# Patient Record
Sex: FEMALE | Race: WHITE | NOT HISPANIC OR LATINO | Employment: OTHER | URBAN - METROPOLITAN AREA
[De-identification: names, ages, dates, MRNs, and addresses within clinical notes are randomized per-mention and may not be internally consistent; named-entity substitution may affect disease eponyms.]

---

## 2017-01-03 ENCOUNTER — OFFICE VISIT (OUTPATIENT)
Dept: HEMATOLOGY/ONCOLOGY | Facility: CLINIC | Age: 67
End: 2017-01-03
Payer: MEDICARE

## 2017-01-03 VITALS
WEIGHT: 119.25 LBS | TEMPERATURE: 98 F | DIASTOLIC BLOOD PRESSURE: 68 MMHG | RESPIRATION RATE: 18 BRPM | HEART RATE: 92 BPM | BODY MASS INDEX: 21.94 KG/M2 | SYSTOLIC BLOOD PRESSURE: 121 MMHG | HEIGHT: 62 IN | OXYGEN SATURATION: 99 %

## 2017-01-03 DIAGNOSIS — C50.012 MALIGNANT NEOPLASM OF NIPPLE OF LEFT BREAST IN FEMALE: Primary | ICD-10-CM

## 2017-01-03 DIAGNOSIS — Z17.0 ESTROGEN RECEPTOR POSITIVE: ICD-10-CM

## 2017-01-03 PROCEDURE — 99999 PR PBB SHADOW E&M-EST. PATIENT-LVL III: CPT | Mod: PBBFAC,,, | Performed by: INTERNAL MEDICINE

## 2017-01-03 PROCEDURE — 99213 OFFICE O/P EST LOW 20 MIN: CPT | Mod: S$PBB,,, | Performed by: INTERNAL MEDICINE

## 2017-01-03 PROCEDURE — 99213 OFFICE O/P EST LOW 20 MIN: CPT | Mod: PBBFAC,PO | Performed by: INTERNAL MEDICINE

## 2017-01-03 NOTE — MR AVS SNAPSHOT
[facility]  [address] [place] [address] Drive [address]  [facility] [address]  Phone: [phone]                  Arlene López   1/3/2017 10:20 AM   Office Visit    Description:  Female : 1950   Provider:  Lisette Aguila MD   Department:  [facility]           Reason for Visit     Results           Diagnoses this Visit        Comments    Malignant neoplasm of nipple of left breast in female    -  Primary     Estrogen receptor positive                To Do List           Future Appointments        Provider Department Dept Phone    3/7/2017 1:00 PM Lisette Aguila MD [facility] [phone]      Goals (5 Years of Data)     None      Follow-Up and Disposition     Return in about 8 weeks (around 2017).      [facility]     [facility] Care [facility] -  Assistance  Registered nurses in the [facility] Call Center provide clinical advisement, health education, appointment booking, and other advisory services.  Call for this free service at [phone].             Medications           Message regarding Medications     Verify the changes and/or additions to your medication regime listed below are the same as discussed with your clinician today.  If any of these changes or additions are incorrect, please notify your healthcare provider.             Verify that the below list of medications is an accurate representation of the medications you are currently taking.  If none reported, the list may be blank. If incorrect, please contact your healthcare provider. Carry this list with you in case of emergency.           Current Medications     aspirin (ECOTRIN) 81 MG EC tablet Take 1 tablet (81 mg total) by mouth once daily.    hydrocodone-acetaminophen 5-325mg (NORCO) 5-325 mg per tablet Take 1 tablet by mouth every 4 (four) hours as needed for Pain.    lisinopril 10 MG tablet Take 1 tablet (10 mg total) by mouth once daily.    pravastatin  "(PRAVACHOL) 20 MG tablet Take 1 tablet (20 mg total) by mouth once daily.    epinephrine (EPIPEN) 0.3 mg/0.3 mL AtIn Inject 0.3 mLs (0.3 mg total) into the muscle once.           Clinical Reference Information           Vital Signs - Last Recorded  Most recent update: 1/3/2017 10:32 AM by Chuyita Whipple LPN    BP Pulse Temp Resp Ht Wt    121/68 (BP Location: Right arm, Patient Position: Sitting, BP Method: Automatic) 92 98.1 °F (36.7 °C) (Oral) 18 5' 1.5" (1.562 m) 54.1 kg (119 lb 4.3 oz)    SpO2 BMI             99% 22.17 kg/m2         Blood Pressure          Most Recent Value    BP  121/68      Allergies as of 1/3/2017     Wasp Venom      Immunizations Administered on Date of Encounter - 1/3/2017     None      "

## 2017-01-03 NOTE — PROGRESS NOTES
Subjective:       Patient ID: Arlene López is a 66 y.o. female.    Chief Complaint: Results    HPI   Pt underwent a left breast lumpectomy for stage I T1b N0 M0 invasive lobular carcinoma.  Receptors are strongly positive for estrogen and negative for HER-2/hardeep.  She developed a postop seroma and has undergone drainage of such.  She is not having pain at her surgical site.  Her incision is closed and she has full range of motion of her left upper extremity with no swelling.      FINAL PATHOLOGIC DIAGNOSIS  1. Lymph node, left sentinel, excision:  One lymph node negative for metastatic carcinoma (0/1).  2. Breast, left, lumpectomy:  Invasive lobular carcinoma, 9 mm, grade 2 (pathologic staging: pT1b N0). SEE SYNOPTIC REPORT.  Negative margins of excision.  Two lymph nodes negative for metastatic carcinoma (0/2).  SYNOPTIC REPORT:  Procedure: Excision with image guided localization.  Lymph node sampling: [place] lymph node.  Specimen laterality: Left.  Specimen site: Upper outer quadrant (per requisition).  Tumor size: 9 mm.  Histologic type: Invasive lobular carcinoma.  Histologic grade, glandular/tubular differentiation: Score 3.  Histologic grade, nuclear pleomorphism: Score 2.  Histologic grade, mitotic rate: Score 1.  Overall grade: 2 (scores of 6 or 7).  Tumor focality: Single focus of invasive carcinoma.  Margins:  Deep/posterior - 1.2 mm.    Number of sentinel nodes examined: One (1).  Pathologic staging, primary tumor: pT1b (tumor > 5 mm but </= 10 mm in greatest dimension).  Pathologic staging, regional lymph node(s): pN0.  Additional pathologic findings: Usual ductal hyperplasia and fibrocystic and fibroadenomatous change.  Ancillary studies:  Immunohistochemical stains for hormonal markers are completed on previous breast biopsy ([id number]): Estrogen receptor: Positive; strong nuclear staining in over 95% tumor cells. Progesterone receptor:  Positive; moderate to strong nuclear staining in 3% tumor  "cells. HER-2/hardeep: Negative (Stain score =  0). Microcalcifications: Present in invasive carcinoma and nonneoplastic tissue.  Note: All lymph node tissue was examined at three levels with routine (H&E) stains and with three epithelial    There has been no change in her past medical history since her last office visit    Current Outpatient Prescriptions:     aspirin (ECOTRIN) 81 MG EC tablet, Take 1 tablet (81 mg total) by mouth once daily., Disp: 90 tablet, Rfl: 3    hydrocodone-acetaminophen 5-325mg (NORCO) 5-325 mg per tablet, Take 1 tablet by mouth every 4 (four) hours as needed for Pain., Disp: 30 tablet, Rfl: 0    lisinopril 10 MG tablet, Take 1 tablet (10 mg total) by mouth once daily., Disp: 90 tablet, Rfl: 3    pravastatin (PRAVACHOL) 20 MG tablet, Take 1 tablet (20 mg total) by mouth once daily., Disp: 90 tablet, Rfl: 3    epinephrine (EPIPEN) 0.3 mg/0.3 mL AtIn, Inject 0.3 mLs (0.3 mg total) into the muscle once., Disp: 0.3 mL, Rfl: 0    All medications and past history have been reviewed.  Review of Systems    CONSTITUTIONAL: No fevers, chills, night sweats, wt. loss, appetite changes  SKIN: no rashes or itching  ENT: No headaches, head trauma, vision changes, or eye pain  LYMPH NODES: None noticed   CV: No chest pain, palpitations.   RESP: No dyspnea on exertion, cough, wheezing, or hemoptysis  GI: No nausea, emesis, diarrhea, constipation, melena, hematochezia, pain.   : No dysuria, hematuria, urgency, or frequency   HEME: No easy bruising, bleeding problems  PSYCHIATRIC: No depression, anxiety, psychosis, hallucinations.  NEURO: No seizures, memory loss, dizziness or difficulty sleeping  MSK: No arthralgias or joint swelling  Objective:       Visit Vitals    /68 (BP Location: Right arm, Patient Position: Sitting, BP Method: Automatic)    Pulse 92    Temp 98.1 °F (36.7 °C) (Oral)    Resp 18    Ht 5' 1.5" (1.562 m)    Wt 54.1 kg (119 lb 4.3 oz)    SpO2 99%    BMI 22.17 kg/m2 "       Physical Exam    Gen: NAD, A and O x3,   Psych: pleasant affect, normal thought process  Eyes: Pupils round and non dilated, EOM intact  Nose: Nares patent  OP clear, mucosa patent  Chest:no use of accessory muscles  Abd: no distention  Extr: No CCE, OSMAN  Neuro: steady gait, CNs grossly intact  Skin: intact, no lesions noted  Rheum: No joint swelling  All lab results and imaging results have been reviewed and discussed with the patient    Pathology and staging reviewed    Assessment:       1. Malignant neoplasm of nipple of left breast in female    2. Estrogen receptor positive        Plan:         Pt ready for post lumpectomy radiotherapy   We discussed whether [facility] will be closer to her in [facility] for radiation  Patient agrees to come to [place] for her radiation hence we'll make her an appointment to see the radiation oncologist at [facility]  She will then be offered  adjuvant endocrine therapy for 5 years with arimidex  Side effects of been explained including possible fluid retention, hot flashes, night sweats, decrease in bone mineral density and dyslipidemia as well as transaminitis  I will see her back in about 8 weeks when she has completed postlumpectomy radiotherapy and at that time Arimidex will be prescribed  She'll be due for a DEXA scan sometime in the near future to evaluate her bone mineral density prior to starting Arimidex  The plan was discussed with the patient and all questions/concerns have been answered to the patient's satisfaction.          Thank you for allowing me to participate in this pleasant patient's care. Please call with any questions or concerns.

## 2017-03-10 ENCOUNTER — OFFICE VISIT (OUTPATIENT)
Dept: HEMATOLOGY/ONCOLOGY | Facility: CLINIC | Age: 67
End: 2017-03-10
Payer: MEDICARE

## 2017-03-10 VITALS
TEMPERATURE: 98 F | HEART RATE: 89 BPM | BODY MASS INDEX: 22.27 KG/M2 | DIASTOLIC BLOOD PRESSURE: 74 MMHG | HEIGHT: 61 IN | SYSTOLIC BLOOD PRESSURE: 163 MMHG | WEIGHT: 117.94 LBS

## 2017-03-10 DIAGNOSIS — F17.200 CURRENT SMOKER: ICD-10-CM

## 2017-03-10 DIAGNOSIS — C50.012 MALIGNANT NEOPLASM OF NIPPLE OF LEFT BREAST IN FEMALE: Primary | ICD-10-CM

## 2017-03-10 DIAGNOSIS — Z17.0 ESTROGEN RECEPTOR POSITIVE: ICD-10-CM

## 2017-03-10 DIAGNOSIS — R05.9 COUGH: ICD-10-CM

## 2017-03-10 PROCEDURE — 99999 PR PBB SHADOW E&M-EST. PATIENT-LVL II: CPT | Mod: PBBFAC,,, | Performed by: INTERNAL MEDICINE

## 2017-03-10 PROCEDURE — 99212 OFFICE O/P EST SF 10 MIN: CPT | Mod: PBBFAC,PO | Performed by: INTERNAL MEDICINE

## 2017-03-10 PROCEDURE — 99215 OFFICE O/P EST HI 40 MIN: CPT | Mod: S$PBB,,, | Performed by: INTERNAL MEDICINE

## 2017-03-10 RX ORDER — NAPROXEN SODIUM 220 MG/1
TABLET, FILM COATED ORAL
COMMUNITY
Start: 2017-02-24 | End: 2017-03-10

## 2017-03-10 NOTE — PROGRESS NOTES
Subjective:       Patient ID: Arlene López is a 66 y.o. female.    Chief Complaint: Follow-up (8 week fu - no labs)    HPI   Pt underwent a left breast lumpectomy for stage I T1b N0 M0 invasive lobular carcinoma.  Receptors are strongly positive for estrogen and negative for HER-2/hardeep.  She completed postlumpectomy radiation  She is here to discuss adjuvant hormonal therapy    She cannot remember when the last mammogram was performed on her right breast  FINAL PATHOLOGIC DIAGNOSIS  1. Lymph node, left sentinel, excision:  One lymph node negative for metastatic carcinoma (0/1).  2. Breast, left, lumpectomy:  Invasive lobular carcinoma, 9 mm, grade 2 (pathologic staging: pT1b N0). SEE SYNOPTIC REPORT.  Negative margins of excision.  Two lymph nodes negative for metastatic carcinoma (0/2).  SYNOPTIC REPORT:  Procedure: Excision with image guided localization.  Lymph node sampling: [place] lymph node.  Specimen laterality: Left.  Specimen site: Upper outer quadrant (per requisition).  Tumor size: 9 mm.  Histologic type: Invasive lobular carcinoma.  Histologic grade, glandular/tubular differentiation: Score 3.  Histologic grade, nuclear pleomorphism: Score 2.  Histologic grade, mitotic rate: Score 1.  Overall grade: 2 (scores of 6 or 7).  Tumor focality: Single focus of invasive carcinoma.  Margins:  Deep/posterior - 1.2 mm.    Number of sentinel nodes examined: One (1).  Pathologic staging, primary tumor: pT1b (tumor > 5 mm but </= 10 mm in greatest dimension).  Pathologic staging, regional lymph node(s): pN0.  Additional pathologic findings: Usual ductal hyperplasia and fibrocystic and fibroadenomatous change.  Ancillary studies:  Immunohistochemical stains for hormonal markers are completed on previous breast biopsy ([id number]): Estrogen receptor: Positive; strong nuclear staining in over 95% tumor cells. Progesterone receptor:  Positive; moderate to strong nuclear staining in 3% tumor cells. HER-2/hardeep: Negative  "(Stain score =  0). Microcalcifications: Present in invasive carcinoma and nonneoplastic tissue.  Note: All lymph node tissue was examined at three levels with routine (H&E) stains and with three epithelial    There has been no change in her past medical history since her last office visit    Current Outpatient Prescriptions:     epinephrine (EPIPEN) 0.3 mg/0.3 mL AtIn, Inject 0.3 mLs (0.3 mg total) into the muscle once., Disp: 0.3 mL, Rfl: 0    aspirin (ECOTRIN) 81 MG EC tablet, Take 1 tablet (81 mg total) by mouth once daily., Disp: 90 tablet, Rfl: 3    lisinopril 10 MG tablet, Take 1 tablet (10 mg total) by mouth once daily., Disp: 90 tablet, Rfl: 3    pravastatin (PRAVACHOL) 20 MG tablet, Take 1 tablet (20 mg total) by mouth once daily., Disp: 90 tablet, Rfl: 3    All medications and past history have been reviewed.  Review of Systems    CONSTITUTIONAL: No fevers, chills, night sweats, wt. loss, appetite changes  SKIN: no rashes or itching  ENT: No headaches, head trauma, vision changes, or eye pain  LYMPH NODES: None noticed   CV: No chest pain, palpitations.   RESP: No dyspnea on exertion, cough, wheezing, or hemoptysis  GI: No nausea, emesis, diarrhea, constipation, melena, hematochezia, pain.   : No dysuria, hematuria, urgency, or frequency   HEME: No easy bruising, bleeding problems  PSYCHIATRIC: No depression, anxiety, psychosis, hallucinations.  NEURO: No seizures, memory loss, dizziness or difficulty sleeping  MSK: No arthralgias or joint swelling  Objective:       BP (!) 163/74  Pulse 89  Temp 98.2 °F (36.8 °C)  Ht 5' 1" (1.549 m)  Wt 53.5 kg (117 lb 15.1 oz)  BMI 22.29 kg/m2    Physical Exam    Gen: NAD, A and O x3,   Psych: pleasant affect, normal thought process  Eyes: Pupils round and non dilated, EOM intact  Nose: Nares patent  OP clear, mucosa patent  Chest:no use of accessory muscles  Abd: no distention  Extr: No CCE, OSMAN  Neuro: steady gait, CNs grossly intact  Skin: intact, no " lesions noted    All lab results and imaging results have been reviewed and discussed with the patient    Pathology and staging reviewed    Assessment:       1. Malignant neoplasm of nipple of left breast in female    2. Current smoker    3. Estrogen receptor positive    4. Cough        Plan:         6 months from now she will undergo a mammogram for her left breast   Currently due for mammogram on her right breast  Discussed tobacco sensation  Now that she completed post lumpectomy radiation She will be offered  adjuvant endocrine therapy for 5 years with arimidex  Side effects of been explained including possible fluid retention, hot flashes, night sweats, decrease in bone mineral density and dyslipidemia as well as transaminitis  Since she is allergic to wasps I willl order her an epipen  She'll be due for a DEXA scan sometime in the near future to evaluate her bone mineral density prior to starting Arimidex  The plan was discussed with the patient and all questions/concerns have been answered to the patient's satisfaction.          Thank you for allowing me to participate in this pleasant patient's care. Please call with any questions or concerns.

## 2017-03-31 PROBLEM — M79.662 PAIN OF LEFT LOWER LEG: Status: ACTIVE | Noted: 2017-03-31

## 2017-03-31 PROBLEM — M79.605 LEFT LEG PAIN: Status: ACTIVE | Noted: 2017-03-31

## 2017-03-31 PROBLEM — R45.86 EMOTIONAL LABILITY: Status: ACTIVE | Noted: 2017-03-31

## 2017-04-07 PROBLEM — M79.672 LEFT FOOT PAIN: Status: ACTIVE | Noted: 2017-04-07

## 2017-04-10 ENCOUNTER — TELEPHONE (OUTPATIENT)
Dept: HEMATOLOGY/ONCOLOGY | Facility: CLINIC | Age: 67
End: 2017-04-10

## 2017-04-10 NOTE — TELEPHONE ENCOUNTER
----- Message from Sarah Romero sent at 4/10/2017  1:25 PM CDT -----  [facility]  needs callback hernán richards (pt waiting)...[phone]

## 2017-04-10 NOTE — TELEPHONE ENCOUNTER
Returned Dr. Molina's phone call and he explained that the pt is there to have her uni lateral mammo but he thinks the pt needs a alex mammo and wanted me to check with Dr. morgan if it was ok to do so and send a new order if she agrees.  Explained to Dr. Molina that Dr. Mrogan is not in the office this afternoon as it is her post call Monday, but I would be happy to ask Dr. Cooley.  Dr. Cooley said that was fine to order a alex mammo with alex breast u/s if needed and faxed a new order to Dr. Molina at the fax number provided.  Dr. Molina verbalizes understanding and appreciation.

## 2017-04-11 ENCOUNTER — OFFICE VISIT (OUTPATIENT)
Dept: HEMATOLOGY/ONCOLOGY | Facility: CLINIC | Age: 67
End: 2017-04-11
Payer: MEDICARE

## 2017-04-11 VITALS
SYSTOLIC BLOOD PRESSURE: 142 MMHG | BODY MASS INDEX: 22.09 KG/M2 | WEIGHT: 117 LBS | RESPIRATION RATE: 18 BRPM | DIASTOLIC BLOOD PRESSURE: 74 MMHG | HEIGHT: 61 IN | HEART RATE: 94 BPM

## 2017-04-11 DIAGNOSIS — Z51.81 VISIT FOR MONITORING ARIMIDEX THERAPY: ICD-10-CM

## 2017-04-11 DIAGNOSIS — R71.8 ERYTHROCYTE ABNORMALITY: ICD-10-CM

## 2017-04-11 DIAGNOSIS — C50.412 BREAST CANCER OF UPPER-OUTER QUADRANT OF LEFT FEMALE BREAST: Primary | ICD-10-CM

## 2017-04-11 DIAGNOSIS — Z17.0 ESTROGEN RECEPTOR POSITIVE: ICD-10-CM

## 2017-04-11 DIAGNOSIS — Z79.811 VISIT FOR MONITORING ARIMIDEX THERAPY: ICD-10-CM

## 2017-04-11 PROCEDURE — 99215 OFFICE O/P EST HI 40 MIN: CPT | Mod: S$GLB,,, | Performed by: INTERNAL MEDICINE

## 2017-04-11 RX ORDER — ANASTROZOLE 1 MG/1
1 TABLET ORAL DAILY
COMMUNITY
Start: 2017-03-13 | End: 2017-07-25 | Stop reason: SDUPTHER

## 2017-04-11 NOTE — MR AVS SNAPSHOT
[facility]  [address] [place] [address]  Phone: [phone]                  Arlene López   2017 1:40 PM   Office Visit    Description:  Female : 1950   Provider:  Lisette Aguila MD   Department:  [facility]           Reason for Visit     Follow-up                To Do List           Goals (5 Years of Data)     None      [facility]     [facility] On Call Nurse Care Line -  Assistance  Unless otherwise directed by your provider, please contact [facility], our nurse care line that is available for  assistance.     Registered nurses in the [facility] Center provide: appointment scheduling, clinical advisement, health education, and other advisory services.  Call: [phone] (toll free)               Medications           Message regarding Medications     Verify the changes and/or additions to your medication regime listed below are the same as discussed with your clinician today.  If any of these changes or additions are incorrect, please notify your healthcare provider.             Verify that the below list of medications is an accurate representation of the medications you are currently taking.  If none reported, the list may be blank. If incorrect, please contact your healthcare provider. Carry this list with you in case of emergency.           Current Medications     anastrozole (ARIMIDEX) 1 mg Tab Take 1 mg by mouth once daily.    aspirin (ECOTRIN) 81 MG EC tablet Take 1 tablet (81 mg total) by mouth once daily.    epinephrine (EPIPEN) 0.3 mg/0.3 mL AtIn Inject 0.3 mLs (0.3 mg total) into the muscle once.    escitalopram oxalate (LEXAPRO) 10 MG tablet Take 1 tablet (10 mg total) by mouth once daily.    lisinopril 10 MG tablet Take 1 tablet (10 mg total) by mouth once daily.    pravastatin (PRAVACHOL) 20 MG tablet Take 1 tablet (20 mg total) by mouth once daily.           Clinical Reference  "Information           Your Vitals Were     BP Pulse Resp Height Weight BMI    142/74 94 18 5' 1" (1.549 m) 53.1 kg (117 lb) 22.11 kg/m2      Blood Pressure          Most Recent Value    BP  (!)  142/74      Allergies as of 4/11/2017     Venom-wasp      Immunizations Administered on Date of Encounter - 4/11/2017     None      Language Assistance Services     ATTENTION: Language assistance services are available, free of charge. Please call [phone].      ATENCIÓN: Si habla ellis, tiene a preston disposición servicios gratuitos de asistencia lingüística. Llame al [phone].     MIKY Ý: N?u b?n nói Ti?ng Vi?t, có các d?ch v? h? tr? ngôn ng? mi?n phí dành cho b?n. G?i s? [phone].         [facility] complies with applicable Federal civil rights laws and does not discriminate on the basis of race, color, national origin, age, disability, or sex.        "

## 2017-04-11 NOTE — PROGRESS NOTES
Subjective:       Patient ID: Arlene López is a 66 y.o. female.    Chief Complaint: Follow-up    HPI   Pt underwent a left breast lumpectomy for stage I T1b N0 M0 invasive lobular carcinoma.  Receptors are strongly positive for estrogen and negative for HER-2/hardeep.  She completed postlumpectomy radiation  She started arimidex in March of 2017 as adjuvant endocrine therapy    There has been no change in her past medical history since her last office visit  She is tolerating this medication without complications  She underwent a mammogram yesterday    Current Outpatient Prescriptions:     anastrozole (ARIMIDEX) 1 mg Tab, Take 1 mg by mouth once daily., Disp: , Rfl:     aspirin (ECOTRIN) 81 MG EC tablet, Take 1 tablet (81 mg total) by mouth once daily., Disp: 90 tablet, Rfl: 3    epinephrine (EPIPEN) 0.3 mg/0.3 mL AtIn, Inject 0.3 mLs (0.3 mg total) into the muscle once., Disp: 0.3 mL, Rfl: 0    escitalopram oxalate (LEXAPRO) 10 MG tablet, Take 1 tablet (10 mg total) by mouth once daily., Disp: 30 tablet, Rfl: 11    lisinopril 10 MG tablet, Take 1 tablet (10 mg total) by mouth once daily., Disp: 90 tablet, Rfl: 3    pravastatin (PRAVACHOL) 20 MG tablet, Take 1 tablet (20 mg total) by mouth once daily., Disp: 90 tablet, Rfl: 3    All medications and past history have been reviewed.  Review of Systems    CONSTITUTIONAL: No fevers, chills, night sweats, wt. loss, appetite changes  SKIN: no rashes or itching  ENT: No headaches, head trauma, vision changes, or eye pain  LYMPH NODES: None noticed   CV: No chest pain, palpitations.   RESP: No dyspnea on exertion, cough, wheezing, or hemoptysis  GI: No nausea, emesis, diarrhea, constipation, melena, hematochezia, pain.   : No dysuria, hematuria, urgency, or frequency   HEME: No easy bruising, bleeding problems  PSYCHIATRIC: No depression, anxiety, psychosis, hallucinations.  NEURO: No seizures, memory loss, dizziness or difficulty sleeping  MSK: No arthralgias or joint  "swelling  Objective:       BP (!) 142/74  Pulse 94  Resp 18  Ht 5' 1" (1.549 m)  Wt 53.1 kg (117 lb)  BMI 22.11 kg/m2    Physical Exam    Gen: NAD, A and O x3,   Psych: pleasant affect, normal thought process  Eyes: Pupils round and non dilated, EOM intact  Nose: Nares patent  OP clear, mucosa patent  Chest:no use of accessory muscles  Abd: no distention  Extr: No CCE, OSMAN  Neuro: steady gait, CNs grossly intact  Skin: intact, no lesions noted    All lab results and imaging results have been reviewed and discussed with the patient  Lab Results   Component Value Date    WBC 7.2 03/24/2017    HGB 15.9 (H) 03/24/2017    HCT 48.0 (H) 03/24/2017    MCV 95.2 03/24/2017     03/24/2017     CMP  Sodium   Date Value Ref Range Status   03/24/2017 137 135 - 146 mmol/L Final     Potassium   Date Value Ref Range Status   03/24/2017 4.1 3.5 - 5.3 mmol/L Final     Chloride   Date Value Ref Range Status   03/24/2017 103 98 - 110 mmol/L Final     CO2   Date Value Ref Range Status   03/24/2017 25 20 - 31 mmol/L Final     Glucose   Date Value Ref Range Status   03/24/2017 135 (H) 65 - 99 mg/dL Final     Comment:                   Fasting reference interval          BUN, Bld   Date Value Ref Range Status   03/24/2017 7 7 - 25 mg/dL Final     Creatinine   Date Value Ref Range Status   03/24/2017 0.63 0.50 - 0.99 mg/dL Final     Comment:     For patients >49 years of age, the reference limit  for Creatinine is approximately 13% higher for people  identified as -American.          Calcium   Date Value Ref Range Status   03/24/2017 9.0 8.6 - 10.4 mg/dL Final     Total Protein   Date Value Ref Range Status   03/24/2017 6.4 6.1 - 8.1 g/dL Final     Albumin   Date Value Ref Range Status   03/24/2017 3.6 3.6 - 5.1 g/dL Final     Total Bilirubin   Date Value Ref Range Status   03/24/2017 0.5 0.2 - 1.2 mg/dL Final     Alkaline Phosphatase   Date Value Ref Range Status   03/24/2017 81 33 - 130 U/L Final     AST   Date Value Ref " Range Status   03/24/2017 19 10 - 35 U/L Final     ALT   Date Value Ref Range Status   03/24/2017 12 6 - 29 U/L Final     Anion Gap   Date Value Ref Range Status   12/02/2016 14 8 - 16 mmol/L Final     eGFR if    Date Value Ref Range Status   03/24/2017 107 > OR = 60 mL/min/1.73m2 Final     eGFR if non    Date Value Ref Range Status   03/24/2017 92 > OR = 60 mL/min/1.73m2 Final       Pathology and staging reviewed    Called radiology to get mammogram results of bilateral mammogram  Assessment:       1. Breast cancer of upper-outer quadrant of left female breast    2. Estrogen receptor positive    3. Visit for monitoring Arimidex therapy    4. Erythrocyte abnormality        Plan:         Elevated Hb likely due to tobacco use  Counseled pt on cessation  Pt is to continue arimidex to decrease risk of recurrence  Check LFTs and lipid panel next visit since AI can cause abn of such  SHe will be due for dexa next visit in 4 months   Calcium and vitamin D essential        Thank you for allowing me to participate in this pleasant patient's care. Please call with any questions or concerns.

## 2017-05-02 DIAGNOSIS — C50.412 MALIGNANT NEOPLASM OF UPPER-OUTER QUADRANT OF LEFT FEMALE BREAST: Primary | ICD-10-CM

## 2017-05-02 DIAGNOSIS — M81.0 OSTEOPOROSIS, UNSPECIFIED OSTEOPOROSIS TYPE, UNSPECIFIED PATHOLOGICAL FRACTURE PRESENCE: ICD-10-CM

## 2017-07-06 ENCOUNTER — TELEPHONE (OUTPATIENT)
Dept: HEMATOLOGY/ONCOLOGY | Facility: CLINIC | Age: 67
End: 2017-07-06

## 2017-07-06 NOTE — TELEPHONE ENCOUNTER
----- Message from Zeynep Bustamante sent at 7/6/2017  9:16 AM CDT -----  Patient spouse Dalton is requesting a return call, has questions regarding medication cotnact number [phone].    Thank you

## 2017-07-07 NOTE — TELEPHONE ENCOUNTER
----- Message from Rosa Bills sent at 7/7/2017 10:12 AM CDT -----  Contact: self [phone]  She would like to know what medication she is on.  Please call her.  Thank you!

## 2017-07-10 NOTE — TELEPHONE ENCOUNTER
----- Message from Ariadna Carvalho sent at 7/10/2017 10:40 AM CDT -----  Contact: self  Patient called regarding a refill of medication. Would like her medication to be sent today. Please contact [phone] (home)     anastrozole (ARIMIDEX) 1 mg Tab    [facility] [place] [place] [facility]  [address] [facility] [address] [place] [address]  Phone: [phone] Fax: [phone]

## 2017-07-13 RX ORDER — ANASTROZOLE 1 MG/1
1 TABLET ORAL DAILY
Qty: 30 TABLET | Refills: 0 | OUTPATIENT
Start: 2017-07-13

## 2017-07-14 ENCOUNTER — TELEPHONE (OUTPATIENT)
Dept: HEMATOLOGY/ONCOLOGY | Facility: CLINIC | Age: 67
End: 2017-07-14

## 2017-07-14 NOTE — TELEPHONE ENCOUNTER
----- Message from Renata Choudhary sent at 7/14/2017 11:26 AM CDT -----  Contact: pt  Pt would like to know if her prescription is ready to be picked up from the office today..[phone] (home)

## 2017-07-14 NOTE — TELEPHONE ENCOUNTER
----- Message from Sarah Tavarez sent at 7/14/2017  9:41 AM CDT -----  Contact: self   Patient need a refill on ARIMIDEX 1 mg will  at office please call when ready at [phone]

## 2017-07-19 ENCOUNTER — TELEPHONE (OUTPATIENT)
Dept: HEMATOLOGY/ONCOLOGY | Facility: CLINIC | Age: 67
End: 2017-07-19

## 2017-07-19 NOTE — TELEPHONE ENCOUNTER
Call number stated in message no answer. Left message on home number instructing patient to call [phone] with concerns.

## 2017-07-19 NOTE — TELEPHONE ENCOUNTER
----- Message from Monica Duffy sent at 7/19/2017  9:26 AM CDT -----  Contact: self  Patient called stating she spoke to a nurse regarding a refill on her medication   She was suppose to call her back   She has not had a response     Please call her at  to advise.     Thanks

## 2017-07-20 ENCOUNTER — TELEPHONE (OUTPATIENT)
Dept: HEMATOLOGY/ONCOLOGY | Facility: CLINIC | Age: 67
End: 2017-07-20

## 2017-07-20 NOTE — TELEPHONE ENCOUNTER
----- Message from eRnata Choudhary sent at 7/20/2017 12:33 PM CDT -----  Contact: pt  Pt returning your phone call...[phone]

## 2017-07-21 ENCOUNTER — TELEPHONE (OUTPATIENT)
Dept: HEMATOLOGY/ONCOLOGY | Facility: CLINIC | Age: 67
End: 2017-07-21

## 2017-07-21 NOTE — TELEPHONE ENCOUNTER
----- Message from Ariadna Carvalho sent at 7/21/2017 10:08 AM CDT -----  Contact: self  Patient called regarding issue with medication. Left a message prior, no contact. Please contact [phone] (home) [phone]

## 2017-07-21 NOTE — TELEPHONE ENCOUNTER
----- Message from Sarah Tavarez sent at 7/20/2017  1:41 PM CDT -----  Contact: self   Placed call to pod, patient miss call from your office please call back at [phone]

## 2017-07-25 DIAGNOSIS — C50.919 MALIGNANT NEOPLASM OF FEMALE BREAST, UNSPECIFIED ESTROGEN RECEPTOR STATUS, UNSPECIFIED LATERALITY, UNSPECIFIED SITE OF BREAST: Primary | ICD-10-CM

## 2017-07-25 RX ORDER — ANASTROZOLE 1 MG/1
1 TABLET ORAL DAILY
Qty: 90 TABLET | Refills: 0 | Status: SHIPPED | OUTPATIENT
Start: 2017-07-25 | End: 2017-10-17 | Stop reason: SDUPTHER

## 2017-07-25 NOTE — TELEPHONE ENCOUNTER
----- Message from Lucy Li sent at 7/25/2017 11:09 AM CDT -----  Contact: self  Call placed to pod.  Patient requested a refill 3 weeks ago for anastrozole (ARIMIDEX) 1 mg Tab and it hasn't been refilled yet. Please call back at [phone] (home)       [place] - [address] [facility]  [address] [facility] [address] [facility] [place] [address]  Phone: [phone] Fax: [phone]

## 2017-09-14 ENCOUNTER — TELEPHONE (OUTPATIENT)
Dept: HEMATOLOGY/ONCOLOGY | Facility: CLINIC | Age: 67
End: 2017-09-14

## 2017-09-14 NOTE — TELEPHONE ENCOUNTER
Returned Heydi's phone call and she said she already found the correct code and does not need anything else at the time.

## 2017-09-14 NOTE — TELEPHONE ENCOUNTER
----- Message from Erica Woodward sent at 9/14/2017 11:59 AM CDT -----  Contact: Heydi [facility]  Heydi with [facility] want to speak with a nurse regarding a wrong diagnostic code for mammogram. Please call back [phone] fax [phone]

## 2017-09-20 ENCOUNTER — TELEPHONE (OUTPATIENT)
Dept: HEMATOLOGY/ONCOLOGY | Facility: CLINIC | Age: 67
End: 2017-09-20

## 2017-10-17 ENCOUNTER — OFFICE VISIT (OUTPATIENT)
Dept: HEMATOLOGY/ONCOLOGY | Facility: CLINIC | Age: 67
End: 2017-10-17
Payer: MEDICARE

## 2017-10-17 VITALS
DIASTOLIC BLOOD PRESSURE: 74 MMHG | HEIGHT: 61 IN | HEART RATE: 98 BPM | BODY MASS INDEX: 21.96 KG/M2 | SYSTOLIC BLOOD PRESSURE: 124 MMHG | WEIGHT: 116.31 LBS | RESPIRATION RATE: 18 BRPM

## 2017-10-17 DIAGNOSIS — Z79.811 VISIT FOR MONITORING ARIMIDEX THERAPY: ICD-10-CM

## 2017-10-17 DIAGNOSIS — M85.80 OSTEOPENIA, UNSPECIFIED LOCATION: ICD-10-CM

## 2017-10-17 DIAGNOSIS — Z17.0 ESTROGEN RECEPTOR POSITIVE: ICD-10-CM

## 2017-10-17 DIAGNOSIS — R92.30 DENSE BREASTS: ICD-10-CM

## 2017-10-17 DIAGNOSIS — C50.912 MALIGNANT NEOPLASM OF LEFT FEMALE BREAST, UNSPECIFIED ESTROGEN RECEPTOR STATUS, UNSPECIFIED SITE OF BREAST: Primary | ICD-10-CM

## 2017-10-17 DIAGNOSIS — Z51.81 VISIT FOR MONITORING ARIMIDEX THERAPY: ICD-10-CM

## 2017-10-17 DIAGNOSIS — D75.1 POLYCYTHEMIA, SECONDARY: ICD-10-CM

## 2017-10-17 DIAGNOSIS — C50.919 MALIGNANT NEOPLASM OF FEMALE BREAST, UNSPECIFIED ESTROGEN RECEPTOR STATUS, UNSPECIFIED LATERALITY, UNSPECIFIED SITE OF BREAST: ICD-10-CM

## 2017-10-17 PROCEDURE — 99215 OFFICE O/P EST HI 40 MIN: CPT | Mod: S$GLB,,, | Performed by: INTERNAL MEDICINE

## 2017-10-17 RX ORDER — PREDNISOLONE ACETATE 10 MG/ML
SUSPENSION/ DROPS OPHTHALMIC
COMMUNITY
Start: 2017-09-14 | End: 2020-11-16 | Stop reason: SDUPTHER

## 2017-10-17 RX ORDER — ANASTROZOLE 1 MG/1
1 TABLET ORAL DAILY
Qty: 90 TABLET | Refills: 0 | Status: SHIPPED | OUTPATIENT
Start: 2017-10-17 | End: 2018-01-22 | Stop reason: SDUPTHER

## 2017-10-17 RX ORDER — NAPROXEN SODIUM 220 MG/1
TABLET, FILM COATED ORAL
COMMUNITY
Start: 2017-09-05 | End: 2017-11-29

## 2017-10-17 RX ORDER — OFLOXACIN 3 MG/ML
SOLUTION/ DROPS OPHTHALMIC
COMMUNITY
Start: 2017-09-14 | End: 2019-08-07 | Stop reason: ALTCHOICE

## 2017-10-17 NOTE — PROGRESS NOTES
Subjective:       Patient ID: Arlene López is a 67 y.o. female.    Chief Complaint: Follow-up    HPI   Pt underwent a left breast lumpectomy for stage I T1b N0 M0 invasive lobular carcinoma.  Receptors are strongly positive for estrogen and negative for HER-2/hardeep.  She completed postlumpectomy radiation  She started arimidex in March of 2017 as adjuvant endocrine therapy    Mammogram performed revealed heterogeneously dense tissue, slightly improved appearance of the postop seroma.  Close follow-up recommended in 6 months.  Bone mineral density scan showed osteopenia for her hips and normal mineralization of her spine    Current Outpatient Prescriptions:     anastrozole (ARIMIDEX) 1 mg Tab, Take 1 tablet (1 mg total) by mouth once daily., Disp: 90 tablet, Rfl: 0    aspirin (ECOTRIN) 81 MG EC tablet, Take 1 tablet (81 mg total) by mouth once daily., Disp: 90 tablet, Rfl: 3    aspirin 81 MG Chew, , Disp: , Rfl:     epinephrine (EPIPEN) 0.3 mg/0.3 mL AtIn, Inject 0.3 mLs (0.3 mg total) into the muscle once., Disp: 0.3 mL, Rfl: 0    escitalopram oxalate (LEXAPRO) 10 MG tablet, Take 1 tablet (10 mg total) by mouth once daily., Disp: 30 tablet, Rfl: 11    lisinopril 10 MG tablet, Take 1 tablet (10 mg total) by mouth once daily., Disp: 90 tablet, Rfl: 3    ofloxacin (OCUFLOX) 0.3 % ophthalmic solution, , Disp: , Rfl:     pravastatin (PRAVACHOL) 20 MG tablet, Take 1 tablet (20 mg total) by mouth once daily., Disp: 90 tablet, Rfl: 3    prednisoLONE acetate (PRED FORTE) 1 % DrpS, , Disp: , Rfl:     All medications and past history have been reviewed.  Review of Systems    CONSTITUTIONAL: No fevers, chills, night sweats, wt. loss, appetite changes  SKIN: no rashes or itching  ENT: No headaches, head trauma, vision changes, or eye pain  LYMPH NODES: None noticed   CV: No chest pain, palpitations.   RESP: No dyspnea on exertion, cough, wheezing, or hemoptysis  GI: No nausea, emesis, diarrhea, constipation, melena,  "hematochezia, pain.   : No dysuria, hematuria, urgency, or frequency   HEME: No easy bruising, bleeding problems  PSYCHIATRIC: No depression, anxiety, psychosis, hallucinations.  NEURO: No seizures, memory loss, dizziness or difficulty sleeping  MSK: No arthralgias or joint swelling  Objective:       /74   Pulse 98   Resp 18   Ht 5' 1" (1.549 m)   Wt 52.8 kg (116 lb 4.8 oz)   BMI 21.97 kg/m²     Physical Exam    Gen: NAD, A and O x3,   Psych: pleasant affect, normal thought process  Eyes: Preset corneal surgery and cataract noted  Nose: Nares patent  OP clear, mucosa patent  Chest:no use of accessory muscles  Abd: no distention  Extr: No CCE, OSMAN  Neuro: steady gait, CNs grossly intact  Skin: intact, no lesions noted    All lab results and imaging results have been reviewed and discussed with the patient  Lab Results   Component Value Date    WBC 7.9 07/19/2017    HGB 16.5 (H) 07/19/2017    HCT 47.2 (H) 07/19/2017    MCV 94.6 07/19/2017    PLT TNP 07/19/2017     CMP  Sodium   Date Value Ref Range Status   07/19/2017 137 135 - 146 mmol/L Final     Potassium   Date Value Ref Range Status   07/19/2017 3.9 3.5 - 5.3 mmol/L Final     Chloride   Date Value Ref Range Status   07/19/2017 102 98 - 110 mmol/L Final     CO2   Date Value Ref Range Status   07/19/2017 23 20 - 31 mmol/L Final     Glucose   Date Value Ref Range Status   07/19/2017 101 (H) 65 - 99 mg/dL Final     Comment:                   Fasting reference interval     For someone without known diabetes, a glucose value  between 100 and 125 mg/dL is consistent with  prediabetes and should be confirmed with a  follow-up test.          BUN, Bld   Date Value Ref Range Status   07/19/2017 6 (L) 7 - 25 mg/dL Final     Creatinine   Date Value Ref Range Status   07/19/2017 0.56 0.50 - 0.99 mg/dL Final     Comment:     For patients >49 years of age, the reference limit  for Creatinine is approximately 13% higher for people  identified as -American.   "        Calcium   Date Value Ref Range Status   07/19/2017 9.4 8.6 - 10.4 mg/dL Final     Total Protein   Date Value Ref Range Status   07/19/2017 6.6 6.1 - 8.1 g/dL Final     Albumin   Date Value Ref Range Status   07/19/2017 4.0 3.6 - 5.1 g/dL Final     Total Bilirubin   Date Value Ref Range Status   07/19/2017 0.6 0.2 - 1.2 mg/dL Final     Alkaline Phosphatase   Date Value Ref Range Status   07/19/2017 73 33 - 130 U/L Final     AST   Date Value Ref Range Status   07/19/2017 24 10 - 35 U/L Final     ALT   Date Value Ref Range Status   07/19/2017 15 6 - 29 U/L Final     Anion Gap   Date Value Ref Range Status   12/02/2016 14 8 - 16 mmol/L Final     eGFR if    Date Value Ref Range Status   07/19/2017 110 > OR = 60 mL/min/1.73m2 Final     eGFR if non    Date Value Ref Range Status   07/19/2017 95 > OR = 60 mL/min/1.73m2 Final       Pathology and staging reviewed    Called radiology to get mammogram results of bilateral mammogram  Assessment:       1. Malignant neoplasm of left female breast, unspecified estrogen receptor status, unspecified site of breast    2. Malignant neoplasm of female breast, unspecified estrogen receptor status, unspecified laterality, unspecified site of breast    3. Dense breasts    4. Osteopenia, unspecified location    5. Estrogen receptor positive    6. Visit for monitoring Arimidex therapy        Plan:       Malignant neoplasm of left female breast, unspecified estrogen receptor status, unspecified site of breast    Malignant neoplasm of female breast, unspecified estrogen receptor status, unspecified laterality, unspecified site of breast  -     anastrozole (ARIMIDEX) 1 mg Tab; Take 1 tablet (1 mg total) by mouth once daily.  Dispense: 90 tablet; Refill: 0    Dense breasts  Needs breast MRI for complete evaluation since recurrent malignancy can be missed on mammo of dense breasts  Osteopenia, unspecified location    Estrogen receptor positive    Visit for  monitoring Arimidex therapy    Polycythemia, secondary    Other orders  -     denosumab (PROLIA) injection 60 mg; Inject 1 mL (60 mg total) into the skin one time.    Will need prolia to vazquez off early onset of osteoporosis due to arimidex  Elevated Hb likely due to tobacco use  Counseled pt on cessation  Pt is to continue arimidex to decrease risk of recurrence  Check LFTs and lipid panel next visit since AI can cause abn of such    Calcium and vitamin D levels to be checked as well        Thank you for allowing me to participate in this pleasant patient's care. Please call with any questions or concerns.

## 2017-12-27 ENCOUNTER — PATIENT MESSAGE (OUTPATIENT)
Dept: HEMATOLOGY/ONCOLOGY | Facility: CLINIC | Age: 67
End: 2017-12-27

## 2017-12-29 ENCOUNTER — TELEPHONE (OUTPATIENT)
Dept: HEMATOLOGY/ONCOLOGY | Facility: CLINIC | Age: 67
End: 2017-12-29

## 2017-12-29 DIAGNOSIS — C50.012 CARCINOMA OF NIPPLE AND AREOLA OF FEMALE BREAST, LEFT: Primary | ICD-10-CM

## 2018-01-15 ENCOUNTER — TELEPHONE (OUTPATIENT)
Dept: HEMATOLOGY/ONCOLOGY | Facility: CLINIC | Age: 68
End: 2018-01-15

## 2018-01-15 NOTE — TELEPHONE ENCOUNTER
----- Message from Renata Choudhary sent at 1/15/2018 10:20 AM CST -----  Contact: pt  Pt calling regarding letter she received from nurse Plummer concerning scheduling a bilateral MRI for the nurse to schedule at [facility]..[phone] (home)

## 2018-01-16 ENCOUNTER — TELEPHONE (OUTPATIENT)
Dept: HEMATOLOGY/ONCOLOGY | Facility: CLINIC | Age: 68
End: 2018-01-16

## 2018-01-16 DIAGNOSIS — C50.012 CARCINOMA OF NIPPLE AND AREOLA OF FEMALE BREAST, LEFT: Primary | ICD-10-CM

## 2018-01-16 NOTE — TELEPHONE ENCOUNTER
----- Message from Edelmira Suazo sent at 1/16/2018  8:53 AM CST -----  Contact: Patient  Arlene, patient [phone] or cell [phone], calling for an appointment. Please advise. Thanks.

## 2018-01-16 NOTE — TELEPHONE ENCOUNTER
Returned pts call and clarified with her that the breast MRI can only be scheduled in [place] or [facility].  Pt given the phone number to schedule MRI in [place] [phone].  Pt verbalizes understanding.

## 2018-01-22 DIAGNOSIS — C50.919 MALIGNANT NEOPLASM OF FEMALE BREAST, UNSPECIFIED ESTROGEN RECEPTOR STATUS, UNSPECIFIED LATERALITY, UNSPECIFIED SITE OF BREAST: ICD-10-CM

## 2018-01-22 RX ORDER — ANASTROZOLE 1 MG/1
1 TABLET ORAL DAILY
Qty: 90 TABLET | Refills: 0 | Status: SHIPPED | OUTPATIENT
Start: 2018-01-22 | End: 2018-04-24 | Stop reason: SDUPTHER

## 2018-01-22 NOTE — TELEPHONE ENCOUNTER
----- Message from Ariadna Carvalho sent at 1/22/2018  8:40 AM CST -----  Contact: self  Patient called regarding refill of medication. Want a 90 day supply called into pharmacy today. Please contact [phone] (home)     anastrozole (ARIMIDEX) 1 mg Tab     [place] [facility] [address]  [address] [facility] [address] [facility] [place] [address]  Phone: [phone] Fax: [phone]

## 2018-01-30 ENCOUNTER — HOSPITAL ENCOUNTER (OUTPATIENT)
Dept: RADIOLOGY | Facility: HOSPITAL | Age: 68
Discharge: HOME | End: 2018-01-30
Attending: INTERNAL MEDICINE
Payer: MEDICARE

## 2018-01-30 DIAGNOSIS — C50.012 CARCINOMA OF NIPPLE AND AREOLA OF FEMALE BREAST, LEFT: ICD-10-CM

## 2018-01-30 PROCEDURE — 0159T MRI BREAST BILATERAL: CPT | Mod: TC,PO

## 2018-01-30 PROCEDURE — 0159T MRI BREAST BILATERAL: CPT | Mod: 26,,, | Performed by: RADIOLOGY

## 2018-01-30 PROCEDURE — A9577 INJ MULTIHANCE: HCPCS | Mod: PO | Performed by: INTERNAL MEDICINE

## 2018-01-30 PROCEDURE — 77059 MRI BREAST BILATERAL: CPT | Mod: 26,,, | Performed by: RADIOLOGY

## 2018-01-30 PROCEDURE — 25500020 PHARM REV CODE 255: Mod: PO | Performed by: INTERNAL MEDICINE

## 2018-01-30 RX ADMIN — GADOBENATE DIMEGLUMINE 10 ML: 529 INJECTION, SOLUTION INTRAVENOUS at 09:01

## 2018-01-31 ENCOUNTER — TELEPHONE (OUTPATIENT)
Dept: RADIOLOGY | Facility: HOSPITAL | Age: 68
End: 2018-01-31

## 2018-01-31 NOTE — TELEPHONE ENCOUNTER
Patient notified of MRI of breast results.   Instructed radiologist recommends yearly screening mammogram.   Patient is concerned, because she has very dense breast.   I told her to follow up with Dr Aguila for further recommendations.

## 2018-02-01 NOTE — TELEPHONE ENCOUNTER
----- Message from Mansi Harmon sent at 2/1/2018  9:38 AM CST -----  Contact: Arlene  Patient is returning call regarding appointment. Please call [phone] if calling back more than an hour from now as will be leaving but until then can call [phone]. Thanks!

## 2018-02-01 NOTE — TELEPHONE ENCOUNTER
Spoke with patient made f/u appt to see Dr. Aguila on Tuesday 2/6/18 in [place]. Gave her directions. She voiced all understanding. Appt to review MRI of B/L breast.

## 2018-02-01 NOTE — TELEPHONE ENCOUNTER
Left voicemail, to [facility] patient. Will call again later to attempt to make an appt.

## 2018-02-06 ENCOUNTER — OFFICE VISIT (OUTPATIENT)
Dept: HEMATOLOGY/ONCOLOGY | Facility: CLINIC | Age: 68
End: 2018-02-06
Payer: MEDICARE

## 2018-02-06 ENCOUNTER — TELEPHONE (OUTPATIENT)
Dept: HEMATOLOGY/ONCOLOGY | Facility: CLINIC | Age: 68
End: 2018-02-06

## 2018-02-06 VITALS
HEIGHT: 61 IN | BODY MASS INDEX: 22.23 KG/M2 | WEIGHT: 117.75 LBS | HEART RATE: 86 BPM | DIASTOLIC BLOOD PRESSURE: 75 MMHG | TEMPERATURE: 99 F | RESPIRATION RATE: 18 BRPM | SYSTOLIC BLOOD PRESSURE: 171 MMHG

## 2018-02-06 DIAGNOSIS — R92.30 DENSE BREASTS: ICD-10-CM

## 2018-02-06 DIAGNOSIS — Z79.811 VISIT FOR MONITORING ARIMIDEX THERAPY: ICD-10-CM

## 2018-02-06 DIAGNOSIS — C50.412 MALIGNANT NEOPLASM OF UPPER-OUTER QUADRANT OF LEFT FEMALE BREAST, UNSPECIFIED ESTROGEN RECEPTOR STATUS: ICD-10-CM

## 2018-02-06 DIAGNOSIS — Z51.81 VISIT FOR MONITORING ARIMIDEX THERAPY: ICD-10-CM

## 2018-02-06 DIAGNOSIS — N64.89 SEROMA OF BREAST: Primary | ICD-10-CM

## 2018-02-06 DIAGNOSIS — M85.80 OSTEOPENIA, UNSPECIFIED LOCATION: ICD-10-CM

## 2018-02-06 DIAGNOSIS — Z17.0 ESTROGEN RECEPTOR POSITIVE: ICD-10-CM

## 2018-02-06 PROCEDURE — 99215 OFFICE O/P EST HI 40 MIN: CPT | Mod: S$PBB,,, | Performed by: INTERNAL MEDICINE

## 2018-02-06 PROCEDURE — 99213 OFFICE O/P EST LOW 20 MIN: CPT | Mod: PBBFAC,PO | Performed by: INTERNAL MEDICINE

## 2018-02-06 PROCEDURE — 1159F MED LIST DOCD IN RCRD: CPT | Mod: ,,, | Performed by: INTERNAL MEDICINE

## 2018-02-06 PROCEDURE — 1126F AMNT PAIN NOTED NONE PRSNT: CPT | Mod: ,,, | Performed by: INTERNAL MEDICINE

## 2018-02-06 PROCEDURE — 99999 PR PBB SHADOW E&M-EST. PATIENT-LVL III: CPT | Mod: PBBFAC,,, | Performed by: INTERNAL MEDICINE

## 2018-02-06 RX ORDER — TERBINAFINE HYDROCHLORIDE 250 MG/1
250 TABLET ORAL DAILY
COMMUNITY
Start: 2018-02-05 | End: 2018-05-15 | Stop reason: ALTCHOICE

## 2018-02-06 RX ORDER — NAPROXEN SODIUM 220 MG/1
81 TABLET, FILM COATED ORAL DAILY
COMMUNITY
Start: 2017-11-30 | End: 2018-02-06 | Stop reason: SDUPTHER

## 2018-02-06 NOTE — PROGRESS NOTES
Subjective:       Patient ID: Arlene López is a 67 y.o. female.    Chief Complaint: Follow-up and Results (MRI)    HPI   Pt underwent a left breast lumpectomy for stage I T1b N0 M0 invasive lobular carcinoma.  Receptors are strongly positive for estrogen and negative for HER-2/hardeep.  She completed postlumpectomy radiation  She started arimidex in March of 2017 as adjuvant endocrine therapy. Pt has dense breasts and was sent for MRI    Mammogram performed revealed heterogeneously dense tissue, slightly improved appearance of the postop seroma.  Close follow-up recommended in 6 months.  Bone mineral density scan showed osteopenia for her hips and normal mineralization of her spine  MRI of breasts reveal no evidence of recurrence and no abnormality  Here to review labs as well      Current Outpatient Prescriptions:     anastrozole (ARIMIDEX) 1 mg Tab, Take 1 tablet (1 mg total) by mouth once daily., Disp: 90 tablet, Rfl: 0    aspirin (ECOTRIN) 81 MG EC tablet, Take 1 tablet (81 mg total) by mouth once daily., Disp: 90 tablet, Rfl: 3    epinephrine (EPIPEN) 0.3 mg/0.3 mL AtIn, Inject 0.3 mLs (0.3 mg total) into the muscle once., Disp: 0.3 mL, Rfl: 0    escitalopram oxalate (LEXAPRO) 10 MG tablet, Take 1 tablet (10 mg total) by mouth once daily., Disp: 30 tablet, Rfl: 11    lisinopril 10 MG tablet, Take 1 tablet (10 mg total) by mouth once daily., Disp: 90 tablet, Rfl: 3    ofloxacin (OCUFLOX) 0.3 % ophthalmic solution, , Disp: , Rfl:     pravastatin (PRAVACHOL) 20 MG tablet, Take 1 tablet (20 mg total) by mouth once daily., Disp: 90 tablet, Rfl: 3    prednisoLONE acetate (PRED FORTE) 1 % DrpS, , Disp: , Rfl:     terbinafine HCl (LAMISIL) 250 mg tablet, Take 250 mg by mouth once daily., Disp: , Rfl:     All medications and past history have been reviewed.  Review of Systems    CONSTITUTIONAL: No fevers, chills, night sweats, wt. loss, appetite changes  SKIN: no rashes or itching  ENT: No headaches, head trauma,  "vision changes, or eye pain  LYMPH NODES: None noticed   CV: No chest pain, palpitations.   RESP: No dyspnea on exertion, cough, wheezing, or hemoptysis  GI: No nausea, emesis, diarrhea, constipation, melena, hematochezia, pain.   : No dysuria, hematuria, urgency, or frequency   HEME: No easy bruising, bleeding problems  PSYCHIATRIC: No depression, anxiety, psychosis, hallucinations.  NEURO: No seizures, memory loss, dizziness or difficulty sleeping  MSK: No arthralgias or joint swelling  Objective:       BP (!) 171/75   Pulse 86   Temp 98.8 °F (37.1 °C)   Resp 18   Ht 5' 1" (1.549 m)   Wt 53.4 kg (117 lb 11.6 oz)   BMI 22.24 kg/m²     Physical Exam    Gen: NAD, A and O x3,   Psych: pleasant affect, normal thought process  Eyes: Preset corneal surgery and cataract noted  Nose: Nares patent  OP clear, mucosa patent  Chest:no use of accessory muscles  Abd: no distention  Extr: No CCE, OSMAN  Neuro: steady gait, CNs grossly intact  Skin: intact, no lesions noted    All lab results and imaging results have been reviewed and discussed with the patient  Lab Results   Component Value Date    WBC 7.4 10/24/2017    HGB 16.8 (H) 10/24/2017    HCT 48.5 (H) 10/24/2017    MCV 94.2 10/24/2017     10/24/2017     CMP  Sodium   Date Value Ref Range Status   10/24/2017 139 135 - 146 mmol/L Final     Potassium   Date Value Ref Range Status   10/24/2017 3.9 3.5 - 5.3 mmol/L Final     Chloride   Date Value Ref Range Status   10/24/2017 102 98 - 110 mmol/L Final     CO2   Date Value Ref Range Status   10/24/2017 27 20 - 31 mmol/L Final     Glucose   Date Value Ref Range Status   10/24/2017 107 (H) 65 - 99 mg/dL Final     Comment:                   Fasting reference interval     For someone without known diabetes, a glucose value  between 100 and 125 mg/dL is consistent with  prediabetes and should be confirmed with a  follow-up test.          BUN, Bld   Date Value Ref Range Status   10/24/2017 7 7 - 25 mg/dL Final "     Creatinine   Date Value Ref Range Status   01/30/2018 0.7 0.5 - 1.4 mg/dL Final     Calcium   Date Value Ref Range Status   10/24/2017 9.8 8.6 - 10.4 mg/dL Final     Total Protein   Date Value Ref Range Status   10/24/2017 6.9 6.1 - 8.1 g/dL Final     Albumin   Date Value Ref Range Status   10/24/2017 4.2 3.6 - 5.1 g/dL Final     Total Bilirubin   Date Value Ref Range Status   10/24/2017 0.6 0.2 - 1.2 mg/dL Final     Alkaline Phosphatase   Date Value Ref Range Status   10/24/2017 74 33 - 130 U/L Final     AST   Date Value Ref Range Status   10/24/2017 21 10 - 35 U/L Final     ALT   Date Value Ref Range Status   10/24/2017 13 6 - 29 U/L Final     Anion Gap   Date Value Ref Range Status   12/02/2016 14 8 - 16 mmol/L Final     eGFR if    Date Value Ref Range Status   01/30/2018 >60 >60 mL/min/1.73 m^2 Final     eGFR if non    Date Value Ref Range Status   01/30/2018 >60 >60 mL/min/1.73 m^2 Final     Comment:     Calculation used to obtain the estimated glomerular filtration  rate (eGFR) is the CKD-EPI equation.          Pathology and staging reviewed    Called radiology to get mammogram results of bilateral mammogram  Assessment:       1. Seroma of breast    2. Estrogen receptor positive    3. Malignant neoplasm of upper-outer quadrant of left female breast, unspecified estrogen receptor status    4. Visit for monitoring Arimidex therapy    5. Osteopenia, unspecified location    6. Dense breasts    7. Hypothyroidism, unspecified type        Plan:           Will need prolia to vazquez off early onset of osteoporosis due to arimidex  Adverse effects and benefits explained  No history of broken bones yet she is stil prone to develop early onset osteoporosis due to the use of AI  Orders have been entered and signed RTC 4 months for evaluation of bone turnover  Needs annual TFTs and cxr post radiation exposure to monitor for secondary malignancy and hypothyroidism  Counseled pt on  cessation  Pt is to continue arimidex to decrease risk of recurrence  Check LFTs and lipid panel next visit since AI can cause abn of such  Cont arimidex for 4 more years  Calcium and vitamin D levels to be checked as well        Thank you for allowing me to participate in this pleasant patient's care. Please call with any questions or concerns.

## 2018-02-06 NOTE — TELEPHONE ENCOUNTER
Message left instructing patient to call [phone] to schedule needed lab work (CMP) for Prolia injection

## 2018-04-24 DIAGNOSIS — C50.919 MALIGNANT NEOPLASM OF FEMALE BREAST, UNSPECIFIED ESTROGEN RECEPTOR STATUS, UNSPECIFIED LATERALITY, UNSPECIFIED SITE OF BREAST: ICD-10-CM

## 2018-04-24 RX ORDER — ANASTROZOLE 1 MG/1
1 TABLET ORAL DAILY
Qty: 90 TABLET | Refills: 0 | Status: SHIPPED | OUTPATIENT
Start: 2018-04-24 | End: 2018-06-06 | Stop reason: SDUPTHER

## 2018-04-24 NOTE — TELEPHONE ENCOUNTER
----- Message from Zo Benedict sent at 4/24/2018  8:32 AM CDT -----  Contact: Patient  Type:  RX Refill Request    Who Called:  Patient  Refill or New Rx:  Refill  RX Name and Strength:  anastrozole (ARIMIDEX) 1 mg Tab  How is the patient currently taking it? (ex. 1XDay): once a day  Is this a 30 day or 90 day RX:  90  Preferred Pharmacy with phone number:    [place] - [address] [facility] [address]  [address] [facility] [address] [place] [address]  Phone: [phone] Fax: [phone]  Local or Mail Order:  local  Ordering Provider:  Mingo Montelongo Call Back Number:  [phone]  Additional Information:

## 2018-05-15 PROBLEM — M79.672 LEFT FOOT PAIN: Status: RESOLVED | Noted: 2017-04-07 | Resolved: 2018-05-15

## 2018-05-15 PROBLEM — M79.605 LEFT LEG PAIN: Status: RESOLVED | Noted: 2017-03-31 | Resolved: 2018-05-15

## 2018-05-15 PROBLEM — M79.662 PAIN OF LEFT LOWER LEG: Status: RESOLVED | Noted: 2017-03-31 | Resolved: 2018-05-15

## 2018-05-30 DIAGNOSIS — Z79.811 USE OF AROMATASE INHIBITORS: ICD-10-CM

## 2018-05-30 DIAGNOSIS — Z51.81 ENCOUNTER FOR THERAPEUTIC DRUG MONITORING: Primary | ICD-10-CM

## 2018-05-31 ENCOUNTER — HOSPITAL ENCOUNTER (OUTPATIENT)
Dept: RADIOLOGY | Facility: HOSPITAL | Age: 68
Discharge: HOME | End: 2018-05-31
Attending: INTERNAL MEDICINE
Payer: MEDICARE

## 2018-05-31 DIAGNOSIS — Z79.811 USE OF AROMATASE INHIBITORS: ICD-10-CM

## 2018-05-31 DIAGNOSIS — Z51.81 ENCOUNTER FOR THERAPEUTIC DRUG MONITORING: ICD-10-CM

## 2018-05-31 PROCEDURE — 71046 X-RAY EXAM CHEST 2 VIEWS: CPT | Mod: TC,FY

## 2018-05-31 PROCEDURE — 71046 X-RAY EXAM CHEST 2 VIEWS: CPT | Mod: 26,,, | Performed by: RADIOLOGY

## 2018-06-04 ENCOUNTER — LAB VISIT (OUTPATIENT)
Dept: LAB | Facility: HOSPITAL | Age: 68
End: 2018-06-04
Attending: INTERNAL MEDICINE
Payer: MEDICARE

## 2018-06-04 DIAGNOSIS — Z51.81 VISIT FOR MONITORING ARIMIDEX THERAPY: ICD-10-CM

## 2018-06-04 DIAGNOSIS — Z79.811 VISIT FOR MONITORING ARIMIDEX THERAPY: ICD-10-CM

## 2018-06-04 DIAGNOSIS — M85.80 OSTEOPENIA, UNSPECIFIED LOCATION: ICD-10-CM

## 2018-06-04 LAB
T3 SERPL-MCNC: 98 NG/DL
T4 FREE SERPL-MCNC: 0.86 NG/DL
TSH SERPL DL<=0.005 MIU/L-ACNC: 1.61 UIU/ML

## 2018-06-04 PROCEDURE — 84480 ASSAY TRIIODOTHYRONINE (T3): CPT

## 2018-06-04 PROCEDURE — 83937 ASSAY OF OSTEOCALCIN: CPT

## 2018-06-04 PROCEDURE — 82652 VIT D 1 25-DIHYDROXY: CPT

## 2018-06-04 PROCEDURE — 36415 COLL VENOUS BLD VENIPUNCTURE: CPT

## 2018-06-04 PROCEDURE — 84439 ASSAY OF FREE THYROXINE: CPT

## 2018-06-04 PROCEDURE — 84443 ASSAY THYROID STIM HORMONE: CPT

## 2018-06-06 ENCOUNTER — OFFICE VISIT (OUTPATIENT)
Dept: HEMATOLOGY/ONCOLOGY | Facility: CLINIC | Age: 68
End: 2018-06-06
Payer: MEDICARE

## 2018-06-06 VITALS
HEIGHT: 61 IN | SYSTOLIC BLOOD PRESSURE: 194 MMHG | WEIGHT: 113.31 LBS | DIASTOLIC BLOOD PRESSURE: 79 MMHG | HEART RATE: 85 BPM | BODY MASS INDEX: 21.39 KG/M2 | TEMPERATURE: 99 F | RESPIRATION RATE: 18 BRPM

## 2018-06-06 DIAGNOSIS — C50.412 MALIGNANT NEOPLASM OF UPPER-OUTER QUADRANT OF LEFT FEMALE BREAST, UNSPECIFIED ESTROGEN RECEPTOR STATUS: Primary | ICD-10-CM

## 2018-06-06 DIAGNOSIS — M85.80 OSTEOPENIA, UNSPECIFIED LOCATION: ICD-10-CM

## 2018-06-06 DIAGNOSIS — R92.30 DENSE BREASTS: ICD-10-CM

## 2018-06-06 DIAGNOSIS — Z51.81 ENCOUNTER FOR MONITORING BISPHOSPHONATE THERAPY: ICD-10-CM

## 2018-06-06 DIAGNOSIS — Z51.81 VISIT FOR MONITORING ARIMIDEX THERAPY: ICD-10-CM

## 2018-06-06 DIAGNOSIS — Z79.811 VISIT FOR MONITORING ARIMIDEX THERAPY: ICD-10-CM

## 2018-06-06 DIAGNOSIS — Z79.83 ENCOUNTER FOR MONITORING BISPHOSPHONATE THERAPY: ICD-10-CM

## 2018-06-06 DIAGNOSIS — M89.8X9 BONY PROMINENCE: ICD-10-CM

## 2018-06-06 DIAGNOSIS — C50.919 MALIGNANT NEOPLASM OF FEMALE BREAST, UNSPECIFIED ESTROGEN RECEPTOR STATUS, UNSPECIFIED LATERALITY, UNSPECIFIED SITE OF BREAST: ICD-10-CM

## 2018-06-06 DIAGNOSIS — R45.86 EMOTIONAL LABILITY: ICD-10-CM

## 2018-06-06 DIAGNOSIS — F17.200 CURRENT SMOKER: ICD-10-CM

## 2018-06-06 DIAGNOSIS — Z17.0 ESTROGEN RECEPTOR POSITIVE: ICD-10-CM

## 2018-06-06 LAB
1,25(OH)2D3 SERPL-MCNC: 62 PG/ML
COLLAGEN CTX SERPL-MCNC: 64 PG/ML

## 2018-06-06 PROCEDURE — 99215 OFFICE O/P EST HI 40 MIN: CPT | Mod: S$PBB,,, | Performed by: INTERNAL MEDICINE

## 2018-06-06 PROCEDURE — 99999 PR PBB SHADOW E&M-EST. PATIENT-LVL III: CPT | Mod: PBBFAC,,, | Performed by: INTERNAL MEDICINE

## 2018-06-06 PROCEDURE — 99213 OFFICE O/P EST LOW 20 MIN: CPT | Mod: PBBFAC,PO | Performed by: INTERNAL MEDICINE

## 2018-06-06 RX ORDER — ANASTROZOLE 1 MG/1
1 TABLET ORAL DAILY
Qty: 90 TABLET | Refills: 0 | Status: SHIPPED | OUTPATIENT
Start: 2018-06-06 | End: 2018-07-31 | Stop reason: SDUPTHER

## 2018-06-06 NOTE — PROGRESS NOTES
Subjective:       Patient ID: Arlene López is a 68 y.o. female.    Chief Complaint: My stitches hurt me   HPI   Pt underwent a left breast lumpectomy for stage I T1b N0 M0 invasive lobular carcinoma.  Receptors are strongly positive for estrogen and negative for HER-2/hardeep.  She completed postlumpectomy radiation  She started arimidex in March of 2017 as adjuvant endocrine therapy. Pt has dense breasts and was sent for MRI    Mammogram performed revealed heterogeneously dense tissue, slightly improved appearance of the postop seroma.  Close follow-up recommended in 6 months.  Bone mineral density scan showed osteopenia for her hips and normal mineralization of her spine  MRI of breasts reveal no evidence of recurrence and no abnormality  Here to review labs and CXR    Pt had corneal transplant , stitches in place for a year     Past Medical History:   Diagnosis Date    Cancer     breast    Full dentures     High cholesterol     HTN (hypertension)     Wears glasses      Social History     Social History    Marital status:      Spouse name: N/A    Number of children: N/A    Years of education: N/A     Social History Main Topics    Smoking status: Former Smoker     Packs/day: 0.50     Types: Vaping w/o nicotine    Smokeless tobacco: Current User    Alcohol use 3.6 oz/week     6 Cans of beer per week      Comment: drinks beer dailyy    Drug use: No    Sexual activity: Not on file     Other Topics Concern    Not on file     Social History Narrative    No narrative on file     Family History   Problem Relation Age of Onset    Heart failure Brother     Heart failure Mother     Cancer Cousin          Current Outpatient Prescriptions:     anastrozole (ARIMIDEX) 1 mg Tab, Take 1 tablet (1 mg total) by mouth once daily., Disp: 90 tablet, Rfl: 0    aspirin (ECOTRIN) 81 MG EC tablet, Take 1 tablet (81 mg total) by mouth once daily., Disp: 90 tablet, Rfl: 3    epinephrine (EPIPEN) 0.3 mg/0.3 mL AtIn,  "Inject 0.3 mLs (0.3 mg total) into the muscle once., Disp: 0.3 mL, Rfl: 0    escitalopram oxalate (LEXAPRO) 10 MG tablet, Take 1 tablet (10 mg total) by mouth once daily., Disp: 30 tablet, Rfl: 3    lisinopril 10 MG tablet, Take 1 tablet (10 mg total) by mouth once daily., Disp: 90 tablet, Rfl: 3    ofloxacin (OCUFLOX) 0.3 % ophthalmic solution, , Disp: , Rfl:     pravastatin (PRAVACHOL) 20 MG tablet, Take 1 tablet (20 mg total) by mouth once daily., Disp: 90 tablet, Rfl: 3    prednisoLONE acetate (PRED FORTE) 1 % DrpS, , Disp: , Rfl:     All medications and past history have been reviewed.  Review of Systems    CONSTITUTIONAL: No fevers, chills, night sweats, wt. loss, appetite changes  SKIN: no rashes or itching  ENT: No headaches, head trauma, + post nasal drip  LYMPH NODES: None noticed   CV: No chest pain, palpitations.   RESP: No dyspnea on exertion,++ cough, wheezing, or hemoptysis  GI: No nausea, emesis, diarrhea, constipation, melena, hematochezia, pain.   : No dysuria, hematuria, urgency, or frequency   HEME: No easy bruising, bleeding problems  PSYCHIATRIC: No depression, anxiety, psychosis, hallucinations.  NEURO: No seizures, memory loss, dizziness or difficulty sleeping  MSK: No arthralgias or joint swelling  Objective:       BP (!) 194/79   Pulse 85   Temp 99 °F (37.2 °C)   Resp 18   Ht 5' 1" (1.549 m)   Wt 51.4 kg (113 lb 5.1 oz)   BMI 21.41 kg/m²     Physical Exam    Gen: NAD, A and O x3,   Psych: pleasant affect, normal thought process  Eyes: EOM intact  Nose: BOGGY turbinates  OP clear, mucosa patent  Throat with pharyngeal edema   Chest:no use of accessory muscles CLEAR  Abd: no distention  Extr: No CCE, OSMAN  Neuro: steady gait, CNs grossly intact  Skin: intact, no lesions noted    Palpable rob prominence sternum, non mobile, sticking out of chest   All lab results and imaging results have been reviewed and discussed with the patient  Lab Results   Component Value Date    WBC 7.5 " 05/07/2018    HGB 16.9 (H) 05/07/2018    HCT 47.9 (H) 05/07/2018    MCV 92.5 05/07/2018     05/07/2018     CMP  Sodium   Date Value Ref Range Status   05/07/2018 136 135 - 146 mmol/L Final     Potassium   Date Value Ref Range Status   05/07/2018 4.0 3.5 - 5.3 mmol/L Final     Chloride   Date Value Ref Range Status   05/07/2018 100 98 - 110 mmol/L Final     CO2   Date Value Ref Range Status   05/07/2018 24 20 - 31 mmol/L Final     Glucose   Date Value Ref Range Status   05/07/2018 137 (H) 65 - 99 mg/dL Final     Comment:                   Fasting reference interval     For someone without known diabetes, a glucose  value >125 mg/dL indicates that they may have  diabetes and this should be confirmed with a  follow-up test.          BUN, Bld   Date Value Ref Range Status   05/07/2018 8 7 - 25 mg/dL Final     Creatinine   Date Value Ref Range Status   05/07/2018 0.59 0.50 - 0.99 mg/dL Final     Comment:     For patients >49 years of age, the reference limit  for Creatinine is approximately 13% higher for people  identified as -American.          Calcium   Date Value Ref Range Status   05/07/2018 9.5 8.6 - 10.4 mg/dL Final     Total Protein   Date Value Ref Range Status   05/07/2018 7.3 6.1 - 8.1 g/dL Final     Albumin   Date Value Ref Range Status   05/07/2018 4.5 3.6 - 5.1 g/dL Final     Total Bilirubin   Date Value Ref Range Status   05/07/2018 0.8 0.2 - 1.2 mg/dL Final     Alkaline Phosphatase   Date Value Ref Range Status   05/07/2018 64 33 - 130 U/L Final     AST   Date Value Ref Range Status   05/07/2018 24 10 - 35 U/L Final     ALT   Date Value Ref Range Status   05/07/2018 16 6 - 29 U/L Final     Anion Gap   Date Value Ref Range Status   12/02/2016 14 8 - 16 mmol/L Final     eGFR if    Date Value Ref Range Status   05/07/2018 108 > OR = 60 mL/min/1.73m2 Final     eGFR if non    Date Value Ref Range Status   05/07/2018 94 > OR = 60 mL/min/1.73m2 Final   CXR  reviewed  10-CM)]     Procedure:  Exam Date: Reason for Exam:   MRI Breast Bilateral 01/30/2018 None Specified             RESULTS:  Result:   MRI Breast Bilateral     History:  Patient is 67 y.o. and is seen for carcinoma of nipple and areola of female breast, left.        Films Compared:     10/31/16        Technique:  A routine breast MRI was performed with a dedicated breast coil. Pre-contrast STIR were acquired. Then, pre and post contrast T1 weighted fat saturated images were acquired and subtracted with MIP reconstruction. 10 ml of intravenous gadolinium contrast was administered. The study was reviewed with [employer] software.     Findings:  The breast has scattered fibroglandular tissue. The background parenchymal enhancement is mild.     A post operative seroma is suspected in the left lateral superior breast of 3.5x 2.0 cm. This demonstrates thin rim enhancement without nodular enahncement and is suggestive of a seroma. Routine mammographic screening is suggested         Impression:  Post operative changes with no significant masses, enhancements, or other abnormal findings are seen.     BI-RADS Category:   Overall: 1 - Negative     Recommendation:  Return to scheduled screening is suggested                          Signed By:  Douglas Mcfarland IV, MD on 1/30/2018  2:33 PM      Pathology and staging reviewed  REVIEWED MRI with her and spouse in detail   Assessment:       1. Malignant neoplasm of upper-outer quadrant of left female breast, unspecified estrogen receptor status    2. Estrogen receptor positive    3. Dense breasts    4. Emotional lability    5. Visit for monitoring Arimidex therapy    6. Osteopenia, unspecified location    7. Current smoker    8. Encounter for monitoring bisphosphonate therapy    9. Malignant neoplasm of female breast, unspecified estrogen receptor status, unspecified laterality, unspecified site of breast    10. Bony prominence        Plan:         Malignant neoplasm of  upper-outer quadrant of left female breast, unspecified estrogen receptor status  -     BONE SCAN; Future; Expected date: 06/06/2018  -     FREE LT CHAIN ANAL; Future; Expected date: 06/06/2018  -     SPEP; Future; Expected date: 06/06/2018  -     LIGIA; Future; Expected date: 06/06/2018    Estrogen receptor positive  -     CBC auto differential; Future; Expected date: 06/06/2018  -     CMP; Future; Expected date: 06/06/2018    Dense breasts    Emotional lability    Visit for monitoring Arimidex therapy    Osteopenia, unspecified location    Current smoker    Encounter for monitoring bisphosphonate therapy    Malignant neoplasm of female breast, unspecified estrogen receptor status, unspecified laterality, unspecified site of breast  -     anastrozole (ARIMIDEX) 1 mg Tab; Take 1 tablet (1 mg total) by mouth once daily.  Dispense: 90 tablet; Refill: 0    Bony prominence  -     BONE SCAN; Future; Expected date: 06/06/2018  -     FREE LT CHAIN ANAL; Future; Expected date: 06/06/2018  -     SPEP; Future; Expected date: 06/06/2018  -     LIGIA; Future; Expected date: 06/06/2018    Other orders  -     denosumab (PROLIA) injection 60 mg; Inject 1 mL (60 mg total) into the skin one time.      Vitamin D pending and osteocalcin pending     Add zyrtec daily to vazquez off bronchitis  Pt is due for mammogram   Cont prolia   Adverse effects and benefits explained  No history of broken bones yet she is stil prone to develop early onset osteoporosis due to the use of AI  Explained to her spouse why she needs this medicien   Cont lexapro 10 mg for anxiety   Pt is to continue arimidex to decrease risk of recurrence  Check LFTs and lipid panel next visit since AI can cause abn of such  Cont arimidex for 4 more years  Calcium and vitamin D levels to be checked as well    cont lisinopril for HTN  tob cessation recommended   If cough continues she primo phione me   If she develops fever she will phone me  Refilling arimidex   Orders in for  mamogram   rtc august for nEXT prolia  With dexa scan before to re evaluate BONY abn on physical exam    SHe may need to hold prolia   Reviewed chart   Thank you for allowing me to participate in this pleasant patient's care. Please call with any questions or concerns.

## 2018-06-11 LAB — OSTEOCALCIN SERPL-MCNC: 7 NG/ML

## 2018-06-12 ENCOUNTER — TELEPHONE (OUTPATIENT)
Dept: HEMATOLOGY/ONCOLOGY | Facility: CLINIC | Age: 68
End: 2018-06-12

## 2018-07-31 DIAGNOSIS — C50.919 MALIGNANT NEOPLASM OF FEMALE BREAST, UNSPECIFIED ESTROGEN RECEPTOR STATUS, UNSPECIFIED LATERALITY, UNSPECIFIED SITE OF BREAST: ICD-10-CM

## 2018-07-31 RX ORDER — ANASTROZOLE 1 MG/1
1 TABLET ORAL DAILY
Qty: 90 TABLET | Refills: 1 | Status: SHIPPED | OUTPATIENT
Start: 2018-07-31 | End: 2018-09-13 | Stop reason: SDUPTHER

## 2018-07-31 NOTE — TELEPHONE ENCOUNTER
----- Message from Kami Sosa sent at 7/31/2018 12:54 PM CDT -----  Refill on Rx [employer] (3 month supply).  Please send into [facility] pharmacy, any questions call [phone].

## 2018-08-01 ENCOUNTER — HOSPITAL ENCOUNTER (OUTPATIENT)
Dept: RADIOLOGY | Facility: HOSPITAL | Age: 68
Discharge: HOME | End: 2018-08-01
Attending: INTERNAL MEDICINE
Payer: MEDICARE

## 2018-08-01 DIAGNOSIS — C50.412 MALIGNANT NEOPLASM OF UPPER-OUTER QUADRANT OF LEFT FEMALE BREAST, UNSPECIFIED ESTROGEN RECEPTOR STATUS: ICD-10-CM

## 2018-08-01 DIAGNOSIS — M89.8X9 BONY PROMINENCE: ICD-10-CM

## 2018-08-01 PROCEDURE — A9503 TC99M MEDRONATE: HCPCS

## 2018-08-01 PROCEDURE — 78306 BONE IMAGING WHOLE BODY: CPT | Mod: 26,,, | Performed by: RADIOLOGY

## 2018-08-06 ENCOUNTER — OFFICE VISIT (OUTPATIENT)
Dept: HEMATOLOGY/ONCOLOGY | Facility: CLINIC | Age: 68
End: 2018-08-06
Payer: MEDICARE

## 2018-08-06 VITALS
WEIGHT: 113.56 LBS | RESPIRATION RATE: 18 BRPM | HEIGHT: 60 IN | BODY MASS INDEX: 22.29 KG/M2 | SYSTOLIC BLOOD PRESSURE: 160 MMHG | HEART RATE: 75 BPM | TEMPERATURE: 98 F | DIASTOLIC BLOOD PRESSURE: 72 MMHG

## 2018-08-06 DIAGNOSIS — Z79.811 VISIT FOR MONITORING ARIMIDEX THERAPY: ICD-10-CM

## 2018-08-06 DIAGNOSIS — C50.912 MALIGNANT NEOPLASM OF LEFT FEMALE BREAST, UNSPECIFIED ESTROGEN RECEPTOR STATUS, UNSPECIFIED SITE OF BREAST: ICD-10-CM

## 2018-08-06 DIAGNOSIS — C50.412 MALIGNANT NEOPLASM OF UPPER-OUTER QUADRANT OF LEFT FEMALE BREAST, UNSPECIFIED ESTROGEN RECEPTOR STATUS: ICD-10-CM

## 2018-08-06 DIAGNOSIS — F17.200 CURRENT SMOKER: ICD-10-CM

## 2018-08-06 DIAGNOSIS — M85.80 OSTEOPENIA, UNSPECIFIED LOCATION: ICD-10-CM

## 2018-08-06 DIAGNOSIS — D75.1 ERYTHROCYTOSIS: Primary | ICD-10-CM

## 2018-08-06 DIAGNOSIS — Z79.83 LONG TERM (CURRENT) USE OF BISPHOSPHONATES: ICD-10-CM

## 2018-08-06 DIAGNOSIS — D75.1 POLYCYTHEMIA, SECONDARY: ICD-10-CM

## 2018-08-06 DIAGNOSIS — Z51.81 ENCOUNTER FOR MONITORING BISPHOSPHONATE THERAPY: ICD-10-CM

## 2018-08-06 DIAGNOSIS — Z72.0 TOBACCO USE: ICD-10-CM

## 2018-08-06 DIAGNOSIS — Z51.81 VISIT FOR MONITORING ARIMIDEX THERAPY: ICD-10-CM

## 2018-08-06 DIAGNOSIS — Z17.0 ESTROGEN RECEPTOR POSITIVE: ICD-10-CM

## 2018-08-06 DIAGNOSIS — R92.30 DENSE BREASTS: ICD-10-CM

## 2018-08-06 DIAGNOSIS — Z79.83 ENCOUNTER FOR MONITORING BISPHOSPHONATE THERAPY: ICD-10-CM

## 2018-08-06 PROCEDURE — 99213 OFFICE O/P EST LOW 20 MIN: CPT | Mod: PBBFAC,PO | Performed by: INTERNAL MEDICINE

## 2018-08-06 PROCEDURE — 99999 PR PBB SHADOW E&M-EST. PATIENT-LVL III: CPT | Mod: PBBFAC,,, | Performed by: INTERNAL MEDICINE

## 2018-08-06 PROCEDURE — 99215 OFFICE O/P EST HI 40 MIN: CPT | Mod: S$PBB,,, | Performed by: INTERNAL MEDICINE

## 2018-08-06 NOTE — PROGRESS NOTES
Subjective:       Patient ID: Arlene López is a 68 y.o. female.    Chief Complaint: Dr Agosto did my eye surgery : due for next eye cataract removal Sept 18   HPI   Pt underwent a left breast lumpectomy for stage I T1b N0 M0 invasive lobular carcinoma.  Receptors are strongly positive for estrogen and negative for HER-2/hardeep.  She completed postlumpectomy radiation  She started arimidex in March of 2017 as adjuvant endocrine therapy. Pt has dense breasts and was sent for MRI which was unrevealing. SHe is tolerating prolia to prevent early onset osteoporosis from the aromatase inhibtor.  last dexa APril 2016. SHe had a bone scan to evaluate the bony prominence involving her sternum. This was negative except for a patchy finding in the left tibia. SHe has no pain , no unsteady gait no history of trauma to the left leg.     Mammogram  At [facility] performed revealed heterogeneously dense tissue, slightly improved appearance of the postop seroma    Bone mineral density scan showed osteopenia for her hips and normal mineralization of her spine  MRI of breasts reveal no evidence of recurrence January 2018, post op seroma stable   Here to review scan results and for arimidex monitoring       Past Medical History:   Diagnosis Date    Cancer     breast    Full dentures     High cholesterol     HTN (hypertension)     Wears glasses          Current Outpatient Prescriptions:     anastrozole (ARIMIDEX) 1 mg Tab, Take 1 tablet (1 mg total) by mouth once daily., Disp: 90 tablet, Rfl: 1    aspirin (ECOTRIN) 81 MG EC tablet, Take 1 tablet (81 mg total) by mouth once daily., Disp: 90 tablet, Rfl: 3    epinephrine (EPIPEN) 0.3 mg/0.3 mL AtIn, Inject 0.3 mLs (0.3 mg total) into the muscle once., Disp: 0.3 mL, Rfl: 0    escitalopram oxalate (LEXAPRO) 10 MG tablet, Take 1 tablet (10 mg total) by mouth once daily., Disp: 90 tablet, Rfl: 3    lisinopril 10 MG tablet, Take 1 tablet (10 mg total) by mouth once daily., Disp: 90 tablet,  Rfl: 3    ofloxacin (OCUFLOX) 0.3 % ophthalmic solution, , Disp: , Rfl:     pravastatin (PRAVACHOL) 20 MG tablet, Take 1 tablet (20 mg total) by mouth once daily., Disp: 90 tablet, Rfl: 3    prednisoLONE acetate (PRED FORTE) 1 % DrpS, , Disp: , Rfl:     All medications and past history have been reviewed.  Review of Systems    CONSTITUTIONAL: No fevers, chills, night sweats, wt. loss, appetite changes  SKIN: no rashes or itching  ENT: No headaches, head trauma, + post nasal drip  LYMPH NODES: None noticed   CV: No chest pain, palpitations.   RESP: No dyspnea on exertion,++ cough, REMAINS  wheezing, or hemoptysis  GI: No nausea, emesis, diarrhea, constipation, melena, hematochezia, pain.   : No dysuria, hematuria, urgency, or frequency   HEME: No easy bruising, bleeding problems  PSYCHIATRIC: No depression, anxiety, psychosis, hallucinations.  NEURO: No seizures, memory loss, dizziness or difficulty sleeping  MSK: No arthralgias or joint swelling  Objective:       BP (!) 160/72   Pulse 75   Temp 97.8 °F (36.6 °C)   Resp 18   Ht 5' (1.524 m)   Wt 51.5 kg (113 lb 8.6 oz)   BMI 22.17 kg/m²     Physical Exam    Gen: NAD, A and O x3,   Psych: pleasant affect, normal thought process  Eyes: EOM intact  Nose: BOGGY turbinates  OP clear, mucosa patent  Throat with pharyngeal edema   Chest:no use of accessory muscles CLEAR  CV RRR, no mrg  Abd: no distention  Extr: No CCE, OSMAN strength normal   Neuro: steady gait, CNs grossly intact  Skin: intact, no lesions noted    Palpable rob prominence sternum, non mobile, sticking out of chest   All lab results and imaging results have been reviewed and discussed with the patient  Lab Results   Component Value Date    WBC 7.28 08/01/2018    HGB 16.9 (H) 08/01/2018    HCT 49.4 (H) 08/01/2018    MCV 94 08/01/2018     08/01/2018     CMP  Sodium   Date Value Ref Range Status   08/01/2018 134 (L) 136 - 145 mmol/L Final     Potassium   Date Value Ref Range Status    08/01/2018 4.0 3.5 - 5.1 mmol/L Final     Chloride   Date Value Ref Range Status   08/01/2018 99 95 - 110 mmol/L Final     CO2   Date Value Ref Range Status   08/01/2018 27 23 - 29 mmol/L Final     Glucose   Date Value Ref Range Status   08/01/2018 147 (H) 70 - 110 mg/dL Final     BUN, Bld   Date Value Ref Range Status   08/01/2018 7 (L) 8 - 23 mg/dL Final     Creatinine   Date Value Ref Range Status   08/01/2018 0.5 0.5 - 1.4 mg/dL Final     Calcium   Date Value Ref Range Status   08/01/2018 9.0 8.7 - 10.5 mg/dL Final     Total Protein   Date Value Ref Range Status   08/01/2018 7.1 6.0 - 8.4 g/dL Final     Albumin   Date Value Ref Range Status   08/01/2018 3.9 3.5 - 5.2 g/dL Final     Total Bilirubin   Date Value Ref Range Status   08/01/2018 1.0 0.1 - 1.0 mg/dL Final     Comment:     For infants and newborns, interpretation of results should be based  on gestational age, weight and in agreement with clinical  observations.  Premature Infant recommended reference ranges:  Up to 24 hours.............<8.0 mg/dL  Up to 48 hours............<12.0 mg/dL  3-5 days..................<15.0 mg/dL  6-29 days.................<15.0 mg/dL       Alkaline Phosphatase   Date Value Ref Range Status   08/01/2018 47 (L) 55 - 135 U/L Final     AST   Date Value Ref Range Status   08/01/2018 30 10 - 40 U/L Final     ALT   Date Value Ref Range Status   08/01/2018 17 10 - 44 U/L Final     Anion Gap   Date Value Ref Range Status   08/01/2018 8 8 - 16 mmol/L Final     eGFR if    Date Value Ref Range Status   08/01/2018 >60.0 >60 mL/min/1.73 m^2 Final     eGFR if non    Date Value Ref Range Status   08/01/2018 >60.0 >60 mL/min/1.73 m^2 Final     Comment:     Calculation used to obtain the estimated glomerular filtration  rate (eGFR) is the CKD-EPI equation.      CXR reviewed    REVIEWED MRI with her and spouse in detail and bone scan   Assessment:       1. Erythrocytosis    2. Tobacco use    3. Polycythemia,  secondary    4. Osteopenia, unspecified location    5. Current smoker    6. Malignant neoplasm of left female breast, unspecified estrogen receptor status, unspecified site of breast    7. Estrogen receptor positive    8. Malignant neoplasm of upper-outer quadrant of left female breast, unspecified estrogen receptor status    9. Dense breasts    10. Visit for monitoring Arimidex therapy        Plan:         Erythrocytosis    Tobacco use    Polycythemia, secondary    Osteopenia, unspecified location    Current smoker    Malignant neoplasm of left female breast, unspecified estrogen receptor status, unspecified site of breast    Estrogen receptor positive    Malignant neoplasm of upper-outer quadrant of left female breast, unspecified estrogen receptor status  -     Mammo Digital Diagnostic Bilateral; Future; Expected date: 08/06/2018    Dense breasts    Visit for monitoring Arimidex therapy  -     Mammo Digital Diagnostic Bilateral; Future; Expected date: 08/06/2018    Encounter for monitoring bisphosphonate therapy    Long term (current) use of bisphosphonates    Other orders  -     denosumab (PROLIA) injection 60 mg; Inject 1 mL (60 mg total) into the skin one time.      Vitamin D pending and osteocalcin pending     Cont  zyrtec daily to vazquez off bronchitis   Cont prolia , due for mamogram to reassess for recurrnce  Explained the importance of tobacco cessation  Increase water intake    Cont lexapro 10 mg for anxiety   Pt is to continue arimidex to decrease risk of recurrence  Check LFTs and lipid panel next visit since AI can cause abn of such  Cont arimidex for 4 more years  Calcium and vitamin D levels to be checked as well    cont lisinopril for HTN  tob cessation recommended counseled for ten minutes  Orders in for mamogram if she did not have this already, I am calling eulalia now   rtc Feb  for next prolia    RTC after mammo  Medications: Certain medications cause side effects that may damage bone and lead  to osteoporosis. These include steroids, treatments for breast cancer, and medications for treating seizures.  Smoking: Smoking increases the risk of fractures.  Alcohol use: Having one to two drinks a day (or more) increases the risk of osteoporosis.    FRAX    Treatments for established osteoporosis include:    Weight-bearing exercise  Calcium and vitamin D supplements  Medications:  Estrogen therapy  Bisphosphonates: Fosamax® (aledronate sodium), Actonel®, Atelvia® (risedronate), Boniva® (ibandronate), Reclast® (zoledronic acid)  Selective estrogen receptor modulators: Evista® (raloxifene)  Parathyroid hormone: Forteo® (teriparatide)  Biologic therapy: Prolia® (denosumab)      Bisphosphonate medication for osteoporosis (Actonel, Fosomax, etc) differ from Prolia (also know as Denosumab) in how they affect the activity of the osteoclast cells.  Osteoclast cells are the  of old bone.  The newly formed osteoclasts and the mature osteoclasts are the cells whose job it is to break down older bone.    Bisphosphonate medications bind directly to bone at sites of active bone breakdown and are then ingested by osteoclasts, the cells that breakdown bone.  When ingested, the osteoclast dies and bone breakdown is prevented.    Prolia is an immunoglobulin, a glycoprotein that works by binding to the cells that become osteoclasts.  When they bind to these cells they lead to the death of the osteoclasts cells and thus stops bone breakdown.  Studies that compare Prolia to bisphosphonates show that there are greater increases in bone mineral density (BMD) with Prolia          Reviewed chart   Thank you for allowing me to participate in this pleasant patient's care. Please call with any questions or concerns.

## 2018-08-24 ENCOUNTER — HOSPITAL ENCOUNTER (OUTPATIENT)
Dept: RADIOLOGY | Facility: HOSPITAL | Age: 68
Discharge: HOME | End: 2018-08-24
Attending: INTERNAL MEDICINE
Payer: MEDICARE

## 2018-08-24 VITALS — BODY MASS INDEX: 21.99 KG/M2 | HEIGHT: 60 IN | WEIGHT: 112 LBS

## 2018-08-24 DIAGNOSIS — Z79.811 VISIT FOR MONITORING ARIMIDEX THERAPY: ICD-10-CM

## 2018-08-24 DIAGNOSIS — C50.412 MALIGNANT NEOPLASM OF UPPER-OUTER QUADRANT OF LEFT FEMALE BREAST, UNSPECIFIED ESTROGEN RECEPTOR STATUS: ICD-10-CM

## 2018-08-24 DIAGNOSIS — Z51.81 VISIT FOR MONITORING ARIMIDEX THERAPY: ICD-10-CM

## 2018-08-24 PROCEDURE — 77066 DX MAMMO INCL CAD BI: CPT | Mod: TC

## 2018-08-24 PROCEDURE — 77066 DX MAMMO INCL CAD BI: CPT | Mod: 26,,, | Performed by: RADIOLOGY

## 2018-09-13 ENCOUNTER — OFFICE VISIT (OUTPATIENT)
Dept: HEMATOLOGY/ONCOLOGY | Facility: CLINIC | Age: 68
End: 2018-09-13
Payer: MEDICARE

## 2018-09-13 VITALS
HEART RATE: 82 BPM | SYSTOLIC BLOOD PRESSURE: 180 MMHG | TEMPERATURE: 99 F | DIASTOLIC BLOOD PRESSURE: 71 MMHG | RESPIRATION RATE: 20 BRPM | WEIGHT: 114 LBS | HEIGHT: 60 IN | BODY MASS INDEX: 22.38 KG/M2

## 2018-09-13 DIAGNOSIS — Z79.811 VISIT FOR MONITORING ARIMIDEX THERAPY: ICD-10-CM

## 2018-09-13 DIAGNOSIS — E78.00 HIGH CHOLESTEROL: ICD-10-CM

## 2018-09-13 DIAGNOSIS — R92.30 DENSE BREASTS: ICD-10-CM

## 2018-09-13 DIAGNOSIS — F41.9 ANXIETY: ICD-10-CM

## 2018-09-13 DIAGNOSIS — I10 HYPERTENSION, ESSENTIAL: ICD-10-CM

## 2018-09-13 DIAGNOSIS — Z79.899 ENCOUNTER FOR LONG-TERM (CURRENT) USE OF MEDICATIONS: ICD-10-CM

## 2018-09-13 DIAGNOSIS — C50.919 MALIGNANT NEOPLASM OF FEMALE BREAST, UNSPECIFIED ESTROGEN RECEPTOR STATUS, UNSPECIFIED LATERALITY, UNSPECIFIED SITE OF BREAST: ICD-10-CM

## 2018-09-13 DIAGNOSIS — Z51.81 VISIT FOR MONITORING ARIMIDEX THERAPY: ICD-10-CM

## 2018-09-13 DIAGNOSIS — F17.200 CURRENT SMOKER: ICD-10-CM

## 2018-09-13 DIAGNOSIS — E87.1 HYPONATREMIA: ICD-10-CM

## 2018-09-13 DIAGNOSIS — R45.89 DYSPHORIC MOOD: ICD-10-CM

## 2018-09-13 DIAGNOSIS — R92.8 ABNORMAL MAMMOGRAM OF LEFT BREAST: ICD-10-CM

## 2018-09-13 DIAGNOSIS — M85.80 OSTEOPENIA, UNSPECIFIED LOCATION: ICD-10-CM

## 2018-09-13 DIAGNOSIS — Z17.0 ESTROGEN RECEPTOR POSITIVE: ICD-10-CM

## 2018-09-13 DIAGNOSIS — C50.412 MALIGNANT NEOPLASM OF UPPER-OUTER QUADRANT OF LEFT FEMALE BREAST, UNSPECIFIED ESTROGEN RECEPTOR STATUS: ICD-10-CM

## 2018-09-13 DIAGNOSIS — D75.1 POLYCYTHEMIA, SECONDARY: Primary | ICD-10-CM

## 2018-09-13 PROCEDURE — 99213 OFFICE O/P EST LOW 20 MIN: CPT | Mod: PBBFAC,PO | Performed by: INTERNAL MEDICINE

## 2018-09-13 PROCEDURE — 99999 PR PBB SHADOW E&M-EST. PATIENT-LVL III: CPT | Mod: PBBFAC,,, | Performed by: INTERNAL MEDICINE

## 2018-09-13 PROCEDURE — 99215 OFFICE O/P EST HI 40 MIN: CPT | Mod: S$PBB,,, | Performed by: INTERNAL MEDICINE

## 2018-09-13 RX ORDER — ANASTROZOLE 1 MG/1
1 TABLET ORAL DAILY
Qty: 90 TABLET | Refills: 3 | Status: SHIPPED | OUTPATIENT
Start: 2018-09-13 | End: 2018-12-17 | Stop reason: SDUPTHER

## 2018-09-13 RX ORDER — ESCITALOPRAM OXALATE 10 MG/1
10 TABLET ORAL DAILY
Qty: 90 TABLET | Refills: 3 | Status: SHIPPED | OUTPATIENT
Start: 2018-09-13 | End: 2019-10-07 | Stop reason: SDUPTHER

## 2018-09-13 RX ORDER — LISINOPRIL 10 MG/1
10 TABLET ORAL DAILY
Qty: 90 TABLET | Refills: 3 | Status: SHIPPED | OUTPATIENT
Start: 2018-09-13 | End: 2019-10-08 | Stop reason: SDUPTHER

## 2018-09-13 RX ORDER — ANASTROZOLE 1 MG/1
1 TABLET ORAL DAILY
Qty: 90 TABLET | Refills: 1 | Status: SHIPPED | OUTPATIENT
Start: 2018-09-13 | End: 2018-09-13 | Stop reason: SDUPTHER

## 2018-09-13 NOTE — PROGRESS NOTES
Subjective:       Patient ID: Arlene López is a 68 y.o. female.    Chief Complaint: due for next eye cataract removal Oct 2 right eye   HPI   Pt underwent a left breast lumpectomy for stage I T1b N0 M0 invasive lobular carcinoma.  Receptors are strongly positive for estrogen and negative for HER-2/hardeep.  She completed postlumpectomy radiation  She started arimidex in March of 2017 as adjuvant endocrine therapy. Pt has dense breasts and was sent for MRI which was unrevealing. SHe is tolerating prolia to prevent early onset osteoporosis from the aromatase inhibtor.  last dexa APril 2016. SHe had a bone scan to evaluate the bony prominence involving her sternum. This was negative except for a patchy finding in the left tibia. SHe has no pain , no unsteady gait no history of trauma to the left leg.     Mammogram  At [place] performed revealed heterogeneously dense tissue, slightly improved appearance of the postop seroma    Bone mineral density scan showed osteopenia for her hips and normal mineralization of her spine  MRI of breasts reveal no evidence of recurrence January 2018, post op seroma stable   Here to review scan results and for arimidex monitoring   Pt tolerated last prolia without complications  Feeling stable on lexapro  Spouse indicates no signs of depression or anxiety while on this medication      Past Medical History:   Diagnosis Date    Cancer     breast    Full dentures     High cholesterol     HTN (hypertension)     Wears glasses          Current Outpatient Medications:     anastrozole (ARIMIDEX) 1 mg Tab, Take 1 tablet (1 mg total) by mouth once daily., Disp: 90 tablet, Rfl: 1    aspirin (ECOTRIN) 81 MG EC tablet, Take 1 tablet (81 mg total) by mouth once daily., Disp: 90 tablet, Rfl: 3    epinephrine (EPIPEN) 0.3 mg/0.3 mL AtIn, Inject 0.3 mLs (0.3 mg total) into the muscle once., Disp: 0.3 mL, Rfl: 0    escitalopram oxalate (LEXAPRO) 10 MG tablet, Take 1 tablet (10 mg total) by mouth once  daily., Disp: 90 tablet, Rfl: 3    lisinopril 10 MG tablet, Take 1 tablet (10 mg total) by mouth once daily., Disp: 90 tablet, Rfl: 3    ofloxacin (OCUFLOX) 0.3 % ophthalmic solution, , Disp: , Rfl:     pravastatin (PRAVACHOL) 20 MG tablet, Take 1 tablet (20 mg total) by mouth once daily., Disp: 90 tablet, Rfl: 3    prednisoLONE acetate (PRED FORTE) 1 % DrpS, , Disp: , Rfl:     All medications and past history have been reviewed.  Review of Systems    CONSTITUTIONAL: No fevers, chills, night sweats, wt. loss, appetite changes  SKIN: no rashes or itching  ENT: No headaches, head trauma, + post nasal drip  LYMPH NODES: None noticed   CV: No chest pain, palpitations.   RESP: No dyspnea on exertion,no cough no  wheezing, or hemoptysis  GI: No nausea, emesis, diarrhea, constipation, melena, hematochezia, pain.   : No dysuria, hematuria, urgency, or frequency   HEME: No easy bruising, bleeding problems  PSYCHIATRIC: No depression, anxiety, psychosis, hallucinations.  NEURO: No seizures, memory loss, dizziness or difficulty sleeping  MSK: No arthralgias or joint swelling  Objective:       BP (!) 180/71   Pulse 82   Temp 98.6 °F (37 °C)   Resp 20   Ht 5' (1.524 m)   Wt 51.7 kg (113 lb 15.7 oz)   BMI 22.26 kg/m²     Recheck pressure 165/71  Physical Exam    Height / weight / VS reviewed  GENERAL.: Well-developed, well-nourished, in no acute distress, conversant   PSYCH: pleasant affect, no anxiety, no depression  HEENT: Normocephalic, lids intact, conjunctiva pink,auricles intact bilaterally   NECK: Supple,trachea midline, no palpable abnormalities  LYMPHATICS: No cervical or axillary adenopathy  RESPIRATORY: CTAB, no wheezes, rubs or rhonchi  Good respiratory effort without any retractions or diaphragmatic movement  CARDIOVASCULAR: no JVD, S1 / S2 with RRR, no murmur, no rub,2+ capillary refill  ABDOMEN: NTND, normal bowel sounds, no palpable HSM or mass  EXTREMITIES: No cyanosis, no clubbing, no edema, no joint  effusion  NEUROLOGICAL: alert and oriented x 3, cranial nerves grossly intact  SKIN: Warm, dry, no rash or lesions noted,  no petechiae or ecchymosis, ++ tenting   Palpable rob prominence sternum, non mobile, sticking out of chest   All lab results and imaging results have been reviewed and discussed with the patient  Lab Results   Component Value Date    WBC 7.28 08/01/2018    HGB 16.9 (H) 08/01/2018    HCT 49.4 (H) 08/01/2018    MCV 94 08/01/2018     08/01/2018     CMP  Sodium   Date Value Ref Range Status   08/01/2018 134 (L) 136 - 145 mmol/L Final     Potassium   Date Value Ref Range Status   08/01/2018 4.0 3.5 - 5.1 mmol/L Final     Chloride   Date Value Ref Range Status   08/01/2018 99 95 - 110 mmol/L Final     CO2   Date Value Ref Range Status   08/01/2018 27 23 - 29 mmol/L Final     Glucose   Date Value Ref Range Status   08/01/2018 147 (H) 70 - 110 mg/dL Final     BUN, Bld   Date Value Ref Range Status   08/01/2018 7 (L) 8 - 23 mg/dL Final     Creatinine   Date Value Ref Range Status   08/01/2018 0.5 0.5 - 1.4 mg/dL Final     Calcium   Date Value Ref Range Status   08/01/2018 9.0 8.7 - 10.5 mg/dL Final     Total Protein   Date Value Ref Range Status   08/01/2018 7.1 6.0 - 8.4 g/dL Final     Albumin   Date Value Ref Range Status   08/01/2018 3.9 3.5 - 5.2 g/dL Final     Total Bilirubin   Date Value Ref Range Status   08/01/2018 1.0 0.1 - 1.0 mg/dL Final     Comment:     For infants and newborns, interpretation of results should be based  on gestational age, weight and in agreement with clinical  observations.  Premature Infant recommended reference ranges:  Up to 24 hours.............<8.0 mg/dL  Up to 48 hours............<12.0 mg/dL  3-5 days..................<15.0 mg/dL  6-29 days.................<15.0 mg/dL       Alkaline Phosphatase   Date Value Ref Range Status   08/01/2018 47 (L) 55 - 135 U/L Final     AST   Date Value Ref Range Status   08/01/2018 30 10 - 40 U/L Final     ALT   Date Value Ref  Range Status   08/01/2018 17 10 - 44 U/L Final     Anion Gap   Date Value Ref Range Status   08/01/2018 8 8 - 16 mmol/L Final     eGFR if    Date Value Ref Range Status   08/01/2018 >60.0 >60 mL/min/1.73 m^2 Final     eGFR if non    Date Value Ref Range Status   08/01/2018 >60.0 >60 mL/min/1.73 m^2 Final     Comment:     Calculation used to obtain the estimated glomerular filtration  rate (eGFR) is the CKD-EPI equation.      CXR reviewed    Reviewed bone scan and m,ammogram and labs   Assessment:       1. Polycythemia, secondary    2. Visit for monitoring Arimidex therapy    3. Osteopenia, unspecified location    4. Current smoker    5. Malignant neoplasm of upper-outer quadrant of left female breast, unspecified estrogen receptor status    6. Estrogen receptor positive    7. Dense breasts    8. Encounter for long-term (current) use of medications        Plan:         Polycythemia, secondary    Visit for monitoring Arimidex therapy  -     FREE LT CHAIN ANAL; Future; Expected date: 09/13/2018  -     CBC auto differential; Future; Expected date: 09/13/2018  -     CMP; Future; Expected date: 09/13/2018    Osteopenia, unspecified location    Current smoker    Malignant neoplasm of upper-outer quadrant of left female breast, unspecified estrogen receptor status    Estrogen receptor positive    Dense breasts    Encounter for long-term (current) use of medications  -     lisinopril 10 MG tablet; Take 1 tablet (10 mg total) by mouth once daily.  Dispense: 90 tablet; Refill: 3    Malignant neoplasm of female breast, unspecified estrogen receptor status, unspecified laterality, unspecified site of breast  -     Discontinue: anastrozole (ARIMIDEX) 1 mg Tab; Take 1 tablet (1 mg total) by mouth once daily.  Dispense: 90 tablet; Refill: 1  -     anastrozole (ARIMIDEX) 1 mg Tab; Take 1 tablet (1 mg total) by mouth once daily.  Dispense: 90 tablet; Refill: 3    Hypertension, essential  -      "lisinopril 10 MG tablet; Take 1 tablet (10 mg total) by mouth once daily.  Dispense: 90 tablet; Refill: 3    High cholesterol  -     lisinopril 10 MG tablet; Take 1 tablet (10 mg total) by mouth once daily.  Dispense: 90 tablet; Refill: 3    Abnormal mammogram of left breast  -     lisinopril 10 MG tablet; Take 1 tablet (10 mg total) by mouth once daily.  Dispense: 90 tablet; Refill: 3    Anxiety  -     escitalopram oxalate (LEXAPRO) 10 MG tablet; Take 1 tablet (10 mg total) by mouth once daily.  Dispense: 90 tablet; Refill: 3    Dysphoric mood  -     escitalopram oxalate (LEXAPRO) 10 MG tablet; Take 1 tablet (10 mg total) by mouth once daily.  Dispense: 90 tablet; Refill: 3    Hyponatremia        MRI breasts due in 6 months to assess for mammo with dense breasts  RTC 4 months with cbc and cmp  Cont lexapro which is keeping her anxiety stable  Needs better hydration with electrolytes    Cont  zyrtec daily to vazquez off bronchitis  Explained the importance of tobacco cessation    Pt is to continue arimidex to decrease risk of recurrence  BP high due to her rushing to get here and she states she is dry   Cont arimidex for 3 more years  Calcium and vitamin D levels to be checked as well    cont lisinopril for HTN  Bisphosphonate counseling   There are two categories of Osteoporosis:  primary and secondary.  Primary osteoporosis includes postmenopausal, senile and idiopathic.  Secondary osteoporosis is caused by various medical conditions including chronic kidney disease, rheumatoid arthritis and hyperthyroidism. Osteoporosis ("porous bone") is a disease that weakens bones, putting them at greater risk for sudden and unexpected fractures.     Medications: Certain medications cause side effects that may damage bone and lead to osteoporosis. These include steroids, treatments for breast cancer, and medications for treating seizures.  Smoking: Smoking increases the risk of fractures.  Alcohol use: Having one to two drinks a " day (or more) increases the risk of osteoporosis.  Medical conditions: People who have had the following should consider earlier screening for osteoporosis (this is not a complete list):  Overactive thyroid, parathyroid, or adrenal glands  History of bariatric (weight loss) surgery  Hormone treatment for breast or prostate cancer  Eating disorders (bulimia, anorexia)  Organ transplant  Celiac disease  Inflammatory bowel disease  Missed periods  Blood diseases such as multiple myeloma    FRAX    Treatments for established osteoporosis include:    Weight-bearing exercise  Calcium and vitamin D supplements  Medications:  Estrogen therapy  Bisphosphonates: Fosamax® (aledronate sodium), Actonel®, Atelvia® (risedronate), Boniva® (ibandronate), Reclast® (zoledronic acid)  Selective estrogen receptor modulators: Evista® (raloxifene)  Parathyroid hormone: Forteo® (teriparatide)  Biologic therapy: Prolia® (denosumab)      Bisphosphonate medication for osteoporosis (Actonel, Fosomax, etc) differ from Prolia (also know as Denosumab) in how they affect the activity of the osteoclast cells.  Osteoclast cells are the  of old bone.  The newly formed osteoclasts and the mature osteoclasts are the cells whose job it is to break down older bone.    Bisphosphonate medications bind directly to bone at sites of active bone breakdown and are then ingested by osteoclasts, the cells that breakdown bone.  When ingested, the osteoclast dies and bone breakdown is prevented.    Prolia is an immunoglobulin, a glycoprotein that works by binding to the cells that become osteoclasts.  When they bind to these cells they lead to the death of the osteoclasts cells and thus stops bone breakdown.  Studies that compare Prolia to bisphosphonates show that there are greater increases in bone mineral density (BMD) with Prolia    No dental extractions while on prolia  For elevated light chains RTC 3 months with routine labs       [facility] Feb 2019  [facility]  [facility]    RTC after mammo  Meds refilled    Thank you for allowing me to participate in this pleasant patient's care. Please call with any questions or concerns.

## 2018-10-19 ENCOUNTER — TELEPHONE (OUTPATIENT)
Dept: HEMATOLOGY/ONCOLOGY | Facility: CLINIC | Age: 68
End: 2018-10-19

## 2018-10-19 NOTE — TELEPHONE ENCOUNTER
Left message for pt to return call to clinic to reschedule 12/19 appointment as Dr. Aguila will not be in clinic this day. Number provided.

## 2018-12-05 ENCOUNTER — TELEPHONE (OUTPATIENT)
Dept: HEMATOLOGY/ONCOLOGY | Facility: CLINIC | Age: 68
End: 2018-12-05

## 2018-12-05 ENCOUNTER — LAB VISIT (OUTPATIENT)
Dept: LAB | Facility: HOSPITAL | Age: 68
End: 2018-12-05
Attending: INTERNAL MEDICINE
Payer: MEDICARE

## 2018-12-05 DIAGNOSIS — Z79.811 VISIT FOR MONITORING ARIMIDEX THERAPY: ICD-10-CM

## 2018-12-05 DIAGNOSIS — Z51.81 VISIT FOR MONITORING ARIMIDEX THERAPY: ICD-10-CM

## 2018-12-05 LAB
ALBUMIN SERPL BCP-MCNC: 4.2 G/DL
ALP SERPL-CCNC: 48 U/L
ALT SERPL W/O P-5'-P-CCNC: 14 U/L
ANION GAP SERPL CALC-SCNC: 8 MMOL/L
AST SERPL-CCNC: 26 U/L
BASOPHILS # BLD AUTO: 0.06 K/UL
BASOPHILS NFR BLD: 0.8 %
BILIRUB SERPL-MCNC: 0.7 MG/DL
BUN SERPL-MCNC: 8 MG/DL
CALCIUM SERPL-MCNC: 9 MG/DL
CHLORIDE SERPL-SCNC: 99 MMOL/L
CO2 SERPL-SCNC: 27 MMOL/L
CREAT SERPL-MCNC: 0.5 MG/DL
DIFFERENTIAL METHOD: ABNORMAL
EOSINOPHIL # BLD AUTO: 0.1 K/UL
EOSINOPHIL NFR BLD: 1.7 %
ERYTHROCYTE [DISTWIDTH] IN BLOOD BY AUTOMATED COUNT: 13.4 %
EST. GFR  (AFRICAN AMERICAN): >60 ML/MIN/1.73 M^2
EST. GFR  (NON AFRICAN AMERICAN): >60 ML/MIN/1.73 M^2
GLUCOSE SERPL-MCNC: 113 MG/DL
HCT VFR BLD AUTO: 49.5 %
HGB BLD-MCNC: 16.6 G/DL
IMM GRANULOCYTES # BLD AUTO: 0.03 K/UL
IMM GRANULOCYTES NFR BLD AUTO: 0.4 %
LYMPHOCYTES # BLD AUTO: 2.2 K/UL
LYMPHOCYTES NFR BLD: 30 %
MCH RBC QN AUTO: 32.5 PG
MCHC RBC AUTO-ENTMCNC: 33.5 G/DL
MCV RBC AUTO: 97 FL
MONOCYTES # BLD AUTO: 0.7 K/UL
MONOCYTES NFR BLD: 9.5 %
NEUTROPHILS # BLD AUTO: 4.3 K/UL
NEUTROPHILS NFR BLD: 57.6 %
NRBC BLD-RTO: 0 /100 WBC
PLATELET # BLD AUTO: 216 K/UL
PMV BLD AUTO: 9.7 FL
POTASSIUM SERPL-SCNC: 4.2 MMOL/L
PROT SERPL-MCNC: 7.6 G/DL
RBC # BLD AUTO: 5.11 M/UL
SODIUM SERPL-SCNC: 134 MMOL/L
WBC # BLD AUTO: 7.44 K/UL

## 2018-12-05 PROCEDURE — 36415 COLL VENOUS BLD VENIPUNCTURE: CPT

## 2018-12-05 PROCEDURE — 80053 COMPREHEN METABOLIC PANEL: CPT

## 2018-12-05 PROCEDURE — 83520 IMMUNOASSAY QUANT NOS NONAB: CPT

## 2018-12-05 PROCEDURE — 85025 COMPLETE CBC W/AUTO DIFF WBC: CPT

## 2018-12-05 NOTE — TELEPHONE ENCOUNTER
Called and spoke to pt to inform her that we have to reschedule her dec. 19th apt in [facility] due to dr. morgan being out. I moved pt appt to too. Pt verbalized understanding.

## 2018-12-06 LAB
KAPPA LC SER QL IA: 2.28 MG/DL
KAPPA LC/LAMBDA SER IA: 1.25
LAMBDA LC SER QL IA: 1.83 MG/DL

## 2018-12-17 DIAGNOSIS — C50.919 MALIGNANT NEOPLASM OF FEMALE BREAST, UNSPECIFIED ESTROGEN RECEPTOR STATUS, UNSPECIFIED LATERALITY, UNSPECIFIED SITE OF BREAST: ICD-10-CM

## 2018-12-18 RX ORDER — ANASTROZOLE 1 MG/1
1 TABLET ORAL DAILY
Qty: 90 TABLET | Refills: 3 | Status: SHIPPED | OUTPATIENT
Start: 2018-12-18 | End: 2019-01-07 | Stop reason: SDUPTHER

## 2018-12-19 ENCOUNTER — TELEPHONE (OUTPATIENT)
Dept: HEMATOLOGY/ONCOLOGY | Facility: CLINIC | Age: 68
End: 2018-12-19

## 2018-12-19 NOTE — TELEPHONE ENCOUNTER
Called and left message for pt asking if she would like to change her upcoming apt with Dr. Aguila to [facility]  Because pt lives in [facility]. Dr. Aguila will be in [place] on the 9th of January. I instructed pt to call .

## 2019-01-04 DIAGNOSIS — C50.919 MALIGNANT NEOPLASM OF FEMALE BREAST, UNSPECIFIED ESTROGEN RECEPTOR STATUS, UNSPECIFIED LATERALITY, UNSPECIFIED SITE OF BREAST: ICD-10-CM

## 2019-01-04 RX ORDER — ANASTROZOLE 1 MG/1
1 TABLET ORAL DAILY
Qty: 90 TABLET | Refills: 3 | Status: CANCELLED | OUTPATIENT
Start: 2019-01-04

## 2019-01-07 DIAGNOSIS — C50.919 MALIGNANT NEOPLASM OF FEMALE BREAST, UNSPECIFIED ESTROGEN RECEPTOR STATUS, UNSPECIFIED LATERALITY, UNSPECIFIED SITE OF BREAST: ICD-10-CM

## 2019-01-07 NOTE — TELEPHONE ENCOUNTER
Spoke to patient and she is calling to follow up on refill on Arimidex. Patient would like order to be sent to [facility] in [facility] as her usual pharmacy has this medication on back order.

## 2019-01-07 NOTE — TELEPHONE ENCOUNTER
----- Message from Renata Choudhary sent at 1/7/2019 10:29 AM CST -----  Contact: pt  Pt states that she is calling to see if the office got her message she sent Thursday or Friday regarding she is needing refill on her anastrozole (ARIMIDEX) 1 mg Tab, have maybe 1 left,please. 3 month supply...[phone]        .  [facility] in [facility] the base have on back order,please

## 2019-01-08 RX ORDER — ANASTROZOLE 1 MG/1
1 TABLET ORAL DAILY
Qty: 90 TABLET | Refills: 3 | Status: SHIPPED | OUTPATIENT
Start: 2019-01-08 | End: 2019-10-23 | Stop reason: SDUPTHER

## 2019-01-08 NOTE — TELEPHONE ENCOUNTER
Mr López called stating that his wife is waiting oh her refill request for Arimidex. I informed Mr López that a refill request has been sent to the doctor to be signed and sent to [facility] in [place].

## 2019-01-09 ENCOUNTER — OFFICE VISIT (OUTPATIENT)
Dept: HEMATOLOGY/ONCOLOGY | Facility: CLINIC | Age: 69
End: 2019-01-09
Payer: MEDICARE

## 2019-01-09 VITALS
SYSTOLIC BLOOD PRESSURE: 154 MMHG | HEART RATE: 85 BPM | HEIGHT: 61 IN | WEIGHT: 113 LBS | DIASTOLIC BLOOD PRESSURE: 80 MMHG | BODY MASS INDEX: 21.34 KG/M2 | TEMPERATURE: 98 F

## 2019-01-09 DIAGNOSIS — Z17.0 ESTROGEN RECEPTOR POSITIVE: ICD-10-CM

## 2019-01-09 DIAGNOSIS — Z51.81 VISIT FOR MONITORING ARIMIDEX THERAPY: ICD-10-CM

## 2019-01-09 DIAGNOSIS — D75.1 POLYCYTHEMIA, SECONDARY: ICD-10-CM

## 2019-01-09 DIAGNOSIS — F17.200 CURRENT SMOKER: ICD-10-CM

## 2019-01-09 DIAGNOSIS — M85.80 OSTEOPENIA, UNSPECIFIED LOCATION: ICD-10-CM

## 2019-01-09 DIAGNOSIS — C50.412 MALIGNANT NEOPLASM OF UPPER-OUTER QUADRANT OF LEFT FEMALE BREAST, UNSPECIFIED ESTROGEN RECEPTOR STATUS: Primary | ICD-10-CM

## 2019-01-09 DIAGNOSIS — R92.30 DENSE BREASTS: ICD-10-CM

## 2019-01-09 DIAGNOSIS — Z79.811 VISIT FOR MONITORING ARIMIDEX THERAPY: ICD-10-CM

## 2019-01-09 PROCEDURE — 99215 PR OFFICE/OUTPT VISIT, EST, LEVL V, 40-54 MIN: ICD-10-PCS | Mod: S$PBB,,, | Performed by: INTERNAL MEDICINE

## 2019-01-09 PROCEDURE — 99999 PR PBB SHADOW E&M-EST. PATIENT-LVL III: ICD-10-PCS | Mod: PBBFAC,,, | Performed by: INTERNAL MEDICINE

## 2019-01-09 PROCEDURE — 99215 OFFICE O/P EST HI 40 MIN: CPT | Mod: S$PBB,,, | Performed by: INTERNAL MEDICINE

## 2019-01-09 PROCEDURE — 99213 OFFICE O/P EST LOW 20 MIN: CPT | Mod: PBBFAC | Performed by: INTERNAL MEDICINE

## 2019-01-09 PROCEDURE — 99999 PR PBB SHADOW E&M-EST. PATIENT-LVL III: CPT | Mod: PBBFAC,,, | Performed by: INTERNAL MEDICINE

## 2019-01-09 NOTE — PROGRESS NOTES
Subjective:       Patient ID: Arlene López is a 68 y.o. female.    Chief Complaint:I have stitches in my right eye   HPI   Pt underwent a left breast lumpectomy for stage I T1b N0 M0 invasive lobular carcinoma.  Receptors are strongly positive for estrogen and negative for HER-2/hardeep.  She completed postlumpectomy radiation  She started arimidex in March of 2017 as adjuvant endocrine therapy. She will continue this until 2022  Pt has dense breasts and was sent for MRI which was unrevealing. SHe is tolerating prolia ( last one in August )  to prevent early onset osteoporosis from the aromatase inhibtor.  last dexa APril 2016. SHe had a bone scan to evaluate the bony prominence involving her sternum. This was negative except for a patchy finding in the left tibia. SHe has no pain , no unsteady gait no history of trauma to the left leg.     Mammogram  At [facility] performed revealed heterogeneously dense tissue, slightly improved appearance of the postop seroma    Bone mineral density scan showed osteopenia for her hips and normal mineralization of her spine  MRI of breasts reveal no evidence of recurrence January 2018, post op seroma stable   Here to review scan results and for arimidex monitoring   Pt tolerated last prolia without complications  Feeling stable on lexapro  Spouse indicates no signs of depression or anxiety while on this medication      Past Medical History:   Diagnosis Date    Cancer     breast    Full dentures     High cholesterol     HTN (hypertension)     Wears glasses          Current Outpatient Medications:     anastrozole (ARIMIDEX) 1 mg Tab, Take 1 tablet (1 mg total) by mouth once daily., Disp: 90 tablet, Rfl: 3    aspirin (ECOTRIN) 81 MG EC tablet, Take 1 tablet (81 mg total) by mouth once daily., Disp: 90 tablet, Rfl: 3    epinephrine (EPIPEN) 0.3 mg/0.3 mL AtIn, Inject 0.3 mLs (0.3 mg total) into the muscle once., Disp: 0.3 mL, Rfl: 0    escitalopram oxalate (LEXAPRO) 10 MG tablet,  Take 1 tablet (10 mg total) by mouth once daily., Disp: 90 tablet, Rfl: 3    lisinopril 10 MG tablet, Take 1 tablet (10 mg total) by mouth once daily., Disp: 90 tablet, Rfl: 3    ofloxacin (OCUFLOX) 0.3 % ophthalmic solution, , Disp: , Rfl:     pravastatin (PRAVACHOL) 20 MG tablet, Take 1 tablet (20 mg total) by mouth once daily., Disp: 90 tablet, Rfl: 3    prednisoLONE acetate (PRED FORTE) 1 % DrpS, , Disp: , Rfl:     All medications and past history have been reviewed.  Review of Systems    CONSTITUTIONAL: No fevers, chills, night sweats, wt. loss, appetite changes  SKIN: no rashes or itching  ENT: No headaches, head trauma, + post nasal drip  LYMPH NODES: None noticed   CV: No chest pain, palpitations.   RESP: No dyspnea on exertion,no cough no  wheezing, or hemoptysis  GI: No nausea, emesis, diarrhea, constipation, melena, hematochezia, pain.   : No dysuria, hematuria, urgency, or frequency   HEME: No easy bruising, bleeding problems  PSYCHIATRIC: No depression, anxiety, psychosis, hallucinations.  NEURO: No seizures, memory loss, dizziness or difficulty sleeping  MSK: No arthralgias or joint swelling  Objective:       There were no vitals taken for this visit.    Recheck pressure 165/71  Physical Exam    Height / weight / VS reviewed  GENERAL.: Well-developed, well-nourished, in no acute distress, conversant   PSYCH: pleasant affect, no anxiety, no depression  HEENT: Normocephalic, lids intact, conjunctiva pink,auricles intact bilaterally   NECK: Supple,trachea midline, no palpable abnormalities  LYMPHATICS: No cervical or axillary adenopathy  RESPIRATORY: CTAB, no wheezes, rubs or rhonchi  Good respiratory effort without any retractions or diaphragmatic movement  CARDIOVASCULAR: no JVD, S1 / S2 with RRR, no murmur, no rub,2+ capillary refill  ABDOMEN: NTND, normal bowel sounds, no palpable HSM or mass  EXTREMITIES: No cyanosis, no clubbing, no edema, no joint effusion  NEUROLOGICAL: alert and oriented x  3, cranial nerves grossly intact  SKIN: Warm, dry, no rash or lesions noted,  no petechiae or ecchymosis, ++ tenting   Palpable rbo prominence sternum, non mobile, sticking out of chest   All lab results and imaging results have been reviewed and discussed with the patient  Lab Results   Component Value Date    WBC 7.44 12/05/2018    HGB 16.6 (H) 12/05/2018    HCT 49.5 (H) 12/05/2018    MCV 97 12/05/2018     12/05/2018     CMP  Sodium   Date Value Ref Range Status   12/05/2018 134 (L) 136 - 145 mmol/L Final     Potassium   Date Value Ref Range Status   12/05/2018 4.2 3.5 - 5.1 mmol/L Final     Chloride   Date Value Ref Range Status   12/05/2018 99 95 - 110 mmol/L Final     CO2   Date Value Ref Range Status   12/05/2018 27 23 - 29 mmol/L Final     Glucose   Date Value Ref Range Status   12/05/2018 113 (H) 70 - 110 mg/dL Final     BUN, Bld   Date Value Ref Range Status   12/05/2018 8 8 - 23 mg/dL Final     Creatinine   Date Value Ref Range Status   12/05/2018 0.5 0.5 - 1.4 mg/dL Final     Calcium   Date Value Ref Range Status   12/05/2018 9.0 8.7 - 10.5 mg/dL Final     Total Protein   Date Value Ref Range Status   12/05/2018 7.6 6.0 - 8.4 g/dL Final     Albumin   Date Value Ref Range Status   12/05/2018 4.2 3.5 - 5.2 g/dL Final     Total Bilirubin   Date Value Ref Range Status   12/05/2018 0.7 0.1 - 1.0 mg/dL Final     Comment:     For infants and newborns, interpretation of results should be based  on gestational age, weight and in agreement with clinical  observations.  Premature Infant recommended reference ranges:  Up to 24 hours.............<8.0 mg/dL  Up to 48 hours............<12.0 mg/dL  3-5 days..................<15.0 mg/dL  6-29 days.................<15.0 mg/dL       Alkaline Phosphatase   Date Value Ref Range Status   12/05/2018 48 (L) 55 - 135 U/L Final     AST   Date Value Ref Range Status   12/05/2018 26 10 - 40 U/L Final     ALT   Date Value Ref Range Status   12/05/2018 14 10 - 44 U/L Final      Anion Gap   Date Value Ref Range Status   12/05/2018 8 8 - 16 mmol/L Final     eGFR if    Date Value Ref Range Status   12/05/2018 >60.0 >60 mL/min/1.73 m^2 Final     eGFR if non    Date Value Ref Range Status   12/05/2018 >60.0 >60 mL/min/1.73 m^2 Final     Comment:     Calculation used to obtain the estimated glomerular filtration  rate (eGFR) is the CKD-EPI equation.      CXR reviewed    Reviewed bone scan and mammogram and labs   Assessment:       1. Malignant neoplasm of upper-outer quadrant of left female breast, unspecified estrogen receptor status    2. Dense breasts    3. Estrogen receptor positive    4. Polycythemia, secondary    5. Visit for monitoring Arimidex therapy    6. Osteopenia, unspecified location    7. Current smoker        Plan:         Malignant neoplasm of upper-outer quadrant of left female breast, unspecified estrogen receptor status  -     CBC auto differential; Future; Expected date: 01/09/2019  -     Comprehensive metabolic panel; Future; Expected date: 01/09/2019    Dense breasts    Estrogen receptor positive    Polycythemia, secondary    Visit for monitoring Arimidex therapy  -     CBC auto differential; Future; Expected date: 01/09/2019  -     Comprehensive metabolic panel; Future; Expected date: 01/09/2019    Osteopenia, unspecified location  -     CBC auto differential; Future; Expected date: 01/09/2019  -     Comprehensive metabolic panel; Future; Expected date: 01/09/2019    Current smoker          RTC Feb with cbc and cmp for prolia   Cont lexapro which is keeping her anxiety stable  Explained the importance of tobacco cessation  Next Prolia in February 2019  Mammogram due in August 2019   Pt is to continue arimidex to decrease risk of recurrence  Cont arimidex   Calcium and vitamin D levels to be checked as well    cont lisinopril for HTN  Bisphosphonate counseling   There are two categories of Osteoporosis:  primary and secondary.  Primary  "osteoporosis includes postmenopausal, senile and idiopathic.  Secondary osteoporosis is caused by various medical conditions including chronic kidney disease, rheumatoid arthritis and hyperthyroidism. Osteoporosis ("porous bone") is a disease that weakens bones, putting them at greater risk for sudden and unexpected fractures.     Medications: Certain medications cause side effects that may damage bone and lead to osteoporosis. These include steroids, treatments for breast cancer, and medications for treating seizures.  Smoking: Smoking increases the risk of fractures.  Alcohol use: Having one to two drinks a day (or more) increases the risk of osteoporosis.  Medical conditions: People who have had the following should consider earlier screening for osteoporosis (this is not a complete list):  Overactive thyroid, parathyroid, or adrenal glands  History of bariatric (weight loss) surgery  Hormone treatment for breast or prostate cancer  Eating disorders (bulimia, anorexia)  Organ transplant  Celiac disease  Inflammatory bowel disease  Missed periods  Blood diseases such as multiple myeloma    FRAX    Treatments for established osteoporosis include:    Weight-bearing exercise  Calcium and vitamin D supplements  Medications:  Estrogen therapy  Bisphosphonates: Fosamax® (aledronate sodium), Actonel®, Atelvia® (risedronate), Boniva® (ibandronate), Reclast® (zoledronic acid)  Selective estrogen receptor modulators: Evista® (raloxifene)  Parathyroid hormone: Forteo® (teriparatide)  Biologic therapy: Prolia® (denosumab)      Bisphosphonate medication for osteoporosis (Actonel, Fosomax, etc) differ from Prolia (also know as Denosumab) in how they affect the activity of the osteoclast cells.  Osteoclast cells are the  of old bone.  The newly formed osteoclasts and the mature osteoclasts are the cells whose job it is to break down older bone.    Bisphosphonate medications bind directly to bone at sites of active bone " breakdown and are then ingested by osteoclasts, the cells that breakdown bone.  When ingested, the osteoclast dies and bone breakdown is prevented.    Prolia is an immunoglobulin, a glycoprotein that works by binding to the cells that become osteoclasts.  When they bind to these cells they lead to the death of the osteoclasts cells and thus stops bone breakdown.  Studies that compare Prolia to bisphosphonates show that there are greater increases in bone mineral density (BMD) with Prolia    No dental extractions while on prolia  For elevated light chains RTC 3 months with routine labs       rtc Feb 2019  for next prolia    RTC after mammo  Meds refilled    Thank you for allowing me to participate in this pleasant patient's care. Please call with any questions or concerns.

## 2019-02-11 ENCOUNTER — LAB VISIT (OUTPATIENT)
Dept: LAB | Facility: HOSPITAL | Age: 69
End: 2019-02-11
Attending: INTERNAL MEDICINE
Payer: MEDICARE

## 2019-02-11 DIAGNOSIS — Z51.81 VISIT FOR MONITORING ARIMIDEX THERAPY: ICD-10-CM

## 2019-02-11 DIAGNOSIS — M85.80 OSTEOPENIA, UNSPECIFIED LOCATION: ICD-10-CM

## 2019-02-11 DIAGNOSIS — Z79.811 VISIT FOR MONITORING ARIMIDEX THERAPY: ICD-10-CM

## 2019-02-11 DIAGNOSIS — C50.412 MALIGNANT NEOPLASM OF UPPER-OUTER QUADRANT OF LEFT FEMALE BREAST, UNSPECIFIED ESTROGEN RECEPTOR STATUS: ICD-10-CM

## 2019-02-11 LAB
ALBUMIN SERPL BCP-MCNC: 3.8 G/DL
ALP SERPL-CCNC: 50 U/L
ALT SERPL W/O P-5'-P-CCNC: 12 U/L
ANION GAP SERPL CALC-SCNC: 12 MMOL/L
AST SERPL-CCNC: 21 U/L
BASOPHILS # BLD AUTO: 0.06 K/UL
BASOPHILS NFR BLD: 0.8 %
BILIRUB SERPL-MCNC: 0.6 MG/DL
BUN SERPL-MCNC: 9 MG/DL
CALCIUM SERPL-MCNC: 8.8 MG/DL
CHLORIDE SERPL-SCNC: 96 MMOL/L
CO2 SERPL-SCNC: 26 MMOL/L
CREAT SERPL-MCNC: 0.6 MG/DL
DIFFERENTIAL METHOD: ABNORMAL
EOSINOPHIL # BLD AUTO: 0.2 K/UL
EOSINOPHIL NFR BLD: 2 %
ERYTHROCYTE [DISTWIDTH] IN BLOOD BY AUTOMATED COUNT: 13.7 %
EST. GFR  (AFRICAN AMERICAN): >60 ML/MIN/1.73 M^2
EST. GFR  (NON AFRICAN AMERICAN): >60 ML/MIN/1.73 M^2
GLUCOSE SERPL-MCNC: 105 MG/DL
HCT VFR BLD AUTO: 49 %
HGB BLD-MCNC: 16.4 G/DL
IMM GRANULOCYTES # BLD AUTO: 0.02 K/UL
IMM GRANULOCYTES NFR BLD AUTO: 0.3 %
LYMPHOCYTES # BLD AUTO: 2.1 K/UL
LYMPHOCYTES NFR BLD: 28.8 %
MCH RBC QN AUTO: 32 PG
MCHC RBC AUTO-ENTMCNC: 33.5 G/DL
MCV RBC AUTO: 96 FL
MONOCYTES # BLD AUTO: 0.6 K/UL
MONOCYTES NFR BLD: 8.3 %
NEUTROPHILS # BLD AUTO: 4.4 K/UL
NEUTROPHILS NFR BLD: 59.8 %
NRBC BLD-RTO: 0 /100 WBC
PLATELET # BLD AUTO: 211 K/UL
PMV BLD AUTO: 9.7 FL
POTASSIUM SERPL-SCNC: 4.1 MMOL/L
PROT SERPL-MCNC: 7.1 G/DL
RBC # BLD AUTO: 5.12 M/UL
SODIUM SERPL-SCNC: 134 MMOL/L
WBC # BLD AUTO: 7.43 K/UL

## 2019-02-11 PROCEDURE — 36415 COLL VENOUS BLD VENIPUNCTURE: CPT

## 2019-02-11 PROCEDURE — 80053 COMPREHEN METABOLIC PANEL: CPT

## 2019-02-11 PROCEDURE — 85025 COMPLETE CBC W/AUTO DIFF WBC: CPT

## 2019-02-13 ENCOUNTER — OFFICE VISIT (OUTPATIENT)
Dept: HEMATOLOGY/ONCOLOGY | Facility: CLINIC | Age: 69
End: 2019-02-13
Payer: MEDICARE

## 2019-02-13 VITALS
TEMPERATURE: 98 F | DIASTOLIC BLOOD PRESSURE: 88 MMHG | RESPIRATION RATE: 16 BRPM | SYSTOLIC BLOOD PRESSURE: 169 MMHG | HEART RATE: 90 BPM | WEIGHT: 113 LBS | HEIGHT: 61 IN | BODY MASS INDEX: 21.34 KG/M2

## 2019-02-13 DIAGNOSIS — Z51.81 ENCOUNTER FOR MONITORING BISPHOSPHONATE THERAPY: ICD-10-CM

## 2019-02-13 DIAGNOSIS — F17.200 CURRENT SMOKER: ICD-10-CM

## 2019-02-13 DIAGNOSIS — N63.20 LEFT BREAST MASS: ICD-10-CM

## 2019-02-13 DIAGNOSIS — Z51.81 VISIT FOR MONITORING ARIMIDEX THERAPY: ICD-10-CM

## 2019-02-13 DIAGNOSIS — M85.80 OSTEOPENIA, UNSPECIFIED LOCATION: ICD-10-CM

## 2019-02-13 DIAGNOSIS — Z17.0 ESTROGEN RECEPTOR POSITIVE: ICD-10-CM

## 2019-02-13 DIAGNOSIS — Z79.83 ENCOUNTER FOR MONITORING BISPHOSPHONATE THERAPY: ICD-10-CM

## 2019-02-13 DIAGNOSIS — Z79.811 VISIT FOR MONITORING ARIMIDEX THERAPY: ICD-10-CM

## 2019-02-13 DIAGNOSIS — D75.1 POLYCYTHEMIA, SECONDARY: ICD-10-CM

## 2019-02-13 DIAGNOSIS — C50.412 MALIGNANT NEOPLASM OF UPPER-OUTER QUADRANT OF LEFT FEMALE BREAST, UNSPECIFIED ESTROGEN RECEPTOR STATUS: Primary | ICD-10-CM

## 2019-02-13 PROCEDURE — 99213 OFFICE O/P EST LOW 20 MIN: CPT | Mod: PBBFAC | Performed by: INTERNAL MEDICINE

## 2019-02-13 PROCEDURE — 99215 PR OFFICE/OUTPT VISIT, EST, LEVL V, 40-54 MIN: ICD-10-PCS | Mod: S$PBB,,, | Performed by: INTERNAL MEDICINE

## 2019-02-13 PROCEDURE — 99999 PR PBB SHADOW E&M-EST. PATIENT-LVL III: ICD-10-PCS | Mod: PBBFAC,,, | Performed by: INTERNAL MEDICINE

## 2019-02-13 PROCEDURE — 99999 PR PBB SHADOW E&M-EST. PATIENT-LVL III: CPT | Mod: PBBFAC,,, | Performed by: INTERNAL MEDICINE

## 2019-02-13 PROCEDURE — 99215 OFFICE O/P EST HI 40 MIN: CPT | Mod: S$PBB,,, | Performed by: INTERNAL MEDICINE

## 2019-02-13 NOTE — PROGRESS NOTES
Subjective:       Patient ID: Arlene López is a 68 y.o. female.    Chief Complaint:I am due for my shot  HPI   Pt underwent a left breast lumpectomy for stage I T1b N0 M0 invasive lobular carcinoma.  Receptors are strongly positive for estrogen and negative for HER-2/hardeep.  She completed postlumpectomy radiation  She started arimidex in March of 2017 as adjuvant endocrine therapy. She will continue this until 2022  She has been olerating prolia to vazquez off early osteoporosis.  Pt has dense breasts and was sent for MRI which was unrevealing. SHe is tolerating prolia ( last one in August )  to prevent early onset osteoporosis from the aromatase inhibtor.  last dexa APril 2016. SHe had a bone scan AUgust 2018  to evaluate the bony prominence involving her sternum. This was negative except for a patchy finding in the left tibia. SHe has no pain , no unsteady gait no history of trauma to the left leg. She used to have pain in her left leg until she changed shows     Stitches remain in right eye .    Mammogram  At [facility] performed revealed heterogeneously dense tissue, slightly improved appearance of the postop seroma    Bone mineral density scan showed osteopenia for her hips and normal mineralization of her spine  MRI of breasts January 2018 reveal no evidence of recurrence January 2018, post op seroma stable   Here to review labs and for arimidex monitoring   Pt tolerated last prolia without complications  Feeling stable on lexapro for depression   Her cholesterol has been slightly above 200 and she is on a statin for such       Past Medical History:   Diagnosis Date    Cancer     breast    Full dentures     High cholesterol     HTN (hypertension)     Wears glasses          Current Outpatient Medications:     anastrozole (ARIMIDEX) 1 mg Tab, Take 1 tablet (1 mg total) by mouth once daily., Disp: 90 tablet, Rfl: 3    aspirin (ECOTRIN) 81 MG EC tablet, Take 1 tablet (81 mg total) by mouth once daily., Disp: 90  "tablet, Rfl: 3    epinephrine (EPIPEN) 0.3 mg/0.3 mL AtIn, Inject 0.3 mLs (0.3 mg total) into the muscle once., Disp: 0.3 mL, Rfl: 0    escitalopram oxalate (LEXAPRO) 10 MG tablet, Take 1 tablet (10 mg total) by mouth once daily., Disp: 90 tablet, Rfl: 3    lisinopril 10 MG tablet, Take 1 tablet (10 mg total) by mouth once daily., Disp: 90 tablet, Rfl: 3    ofloxacin (OCUFLOX) 0.3 % ophthalmic solution, , Disp: , Rfl:     pravastatin (PRAVACHOL) 20 MG tablet, Take 1 tablet (20 mg total) by mouth once daily., Disp: 90 tablet, Rfl: 3    prednisoLONE acetate (PRED FORTE) 1 % DrpS, , Disp: , Rfl:     All medications and past history have been reviewed.  Review of Systems    CONSTITUTIONAL: No fevers, chills, night sweats, wt. loss, appetite changes  SKIN: no rashes or itching  ENT: No headaches, head trauma, + post nasal drip  LYMPH NODES: None noticed   CV: No chest pain, palpitations.   RESP: No dyspnea on exertion,no cough no  wheezing, or hemoptysis  GI: No nausea, emesis, diarrhea, constipation, melena, hematochezia, pain.   : No dysuria, hematuria, urgency, or frequency   HEME: No easy bruising, bleeding problems  PSYCHIATRIC: No depression, anxiety, psychosis, hallucinations.  NEURO: No seizures, memory loss, dizziness or difficulty sleeping  MSK: No arthralgias or joint swelling  Objective:       BP (!) 169/88   Pulse 90   Temp 98 °F (36.7 °C) (Oral)   Resp 16   Ht 5' 1" (1.549 m)   Wt 51.3 kg (113 lb)   BMI 21.35 kg/m²     Physical Exam    Height / weight / VS reviewed  GENERAL.: Well-developed, well-nourished, in no acute distress, conversant well groomed  PSYCH: pleasant affect, no anxiety, no depression  HEENT: Normocephalic, lids intact, conjunctiva pink, auricles intact bilaterally . No oral lesions   NECK: Supple,trachea midline, no palpable abnormalities  LYMPHATICS: No cervical or axillary adenopathy  RESPIRATORY: CTAB, no wheezes, rubs or rhonchi  Good respiratory effort without any " retractions or diaphragmatic movement  Breasts: no nipple discharge , left breast 3 o'clock lateral position dense hard fixed mass evident   CARDIOVASCULAR: no JVD, S1 / S2 with RRR, no murmur, no rub,2+ capillary refill  ABDOMEN: NTND, normal bowel sounds, no palpable HSM or mass  EXTREMITIES: No cyanosis, no clubbing, no edema, no joint effusion  NEUROLOGICAL: alert and oriented x 3, cranial nerves grossly intact  SKIN: Warm, dry, no rash or lesions noted,  no petechiae or ecchymosis, no  tenting   Palpable rob prominence sternum, non mobile, sticking out of chest   All lab results and imaging results have been reviewed and discussed with the patient  Lab Results   Component Value Date    WBC 7.43 02/11/2019    HGB 16.4 (H) 02/11/2019    HCT 49.0 (H) 02/11/2019    MCV 96 02/11/2019     02/11/2019     CMP  Sodium   Date Value Ref Range Status   02/11/2019 134 (L) 136 - 145 mmol/L Final     Potassium   Date Value Ref Range Status   02/11/2019 4.1 3.5 - 5.1 mmol/L Final     Chloride   Date Value Ref Range Status   02/11/2019 96 95 - 110 mmol/L Final     CO2   Date Value Ref Range Status   02/11/2019 26 23 - 29 mmol/L Final     Glucose   Date Value Ref Range Status   02/11/2019 105 70 - 110 mg/dL Final     BUN, Bld   Date Value Ref Range Status   02/11/2019 9 8 - 23 mg/dL Final     Creatinine   Date Value Ref Range Status   02/11/2019 0.6 0.5 - 1.4 mg/dL Final     Calcium   Date Value Ref Range Status   02/11/2019 8.8 8.7 - 10.5 mg/dL Final     Total Protein   Date Value Ref Range Status   02/11/2019 7.1 6.0 - 8.4 g/dL Final     Albumin   Date Value Ref Range Status   02/11/2019 3.8 3.5 - 5.2 g/dL Final     Total Bilirubin   Date Value Ref Range Status   02/11/2019 0.6 0.1 - 1.0 mg/dL Final     Comment:     For infants and newborns, interpretation of results should be based  on gestational age, weight and in agreement with clinical  observations.  Premature Infant recommended reference ranges:  Up to 24  hours.............<8.0 mg/dL  Up to 48 hours............<12.0 mg/dL  3-5 days..................<15.0 mg/dL  6-29 days.................<15.0 mg/dL       Alkaline Phosphatase   Date Value Ref Range Status   02/11/2019 50 (L) 55 - 135 U/L Final     AST   Date Value Ref Range Status   02/11/2019 21 10 - 40 U/L Final     ALT   Date Value Ref Range Status   02/11/2019 12 10 - 44 U/L Final     Anion Gap   Date Value Ref Range Status   02/11/2019 12 8 - 16 mmol/L Final     eGFR if    Date Value Ref Range Status   02/11/2019 >60.0 >60 mL/min/1.73 m^2 Final     eGFR if non    Date Value Ref Range Status   02/11/2019 >60.0 >60 mL/min/1.73 m^2 Final     Comment:     Calculation used to obtain the estimated glomerular filtration  rate (eGFR) is the CKD-EPI equation.      CXR reviewed    Reviewed bone scan and mammogram and labs   Assessment:       1. Malignant neoplasm of upper-outer quadrant of left female breast, unspecified estrogen receptor status    2. Estrogen receptor positive    3. Visit for monitoring Arimidex therapy    4. Polycythemia, secondary    5. Osteopenia, unspecified location    6. Current smoker    7. Left breast mass    8. Encounter for monitoring bisphosphonate therapy        Plan:         Malignant neoplasm of upper-outer quadrant of left female breast, unspecified estrogen receptor status  -     US Breast Left Complete; Future; Expected date: 02/13/2019    Estrogen receptor positive    Visit for monitoring Arimidex therapy  -     Immunoglobulin free LT chains blood; Future; Expected date: 02/13/2019  -     DXA Bone Density Spine And Hip; Future; Expected date: 02/13/2019    Polycythemia, secondary    Osteopenia, unspecified location  -     Immunoglobulin free LT chains blood; Future; Expected date: 02/13/2019  -     DXA Bone Density Spine And Hip; Future; Expected date: 02/13/2019    Current smoker    Left breast mass  -     US Breast Left Complete; Future; Expected date:  02/13/2019    Encounter for monitoring bisphosphonate therapy  -     Immunoglobulin free LT chains blood; Future; Expected date: 02/13/2019  -     DXA Bone Density Spine And Hip; Future; Expected date: 02/13/2019          RTC Feb with cbc and cmp for prolia   Cont lexapro which is keeping her anxiety stable  Explained the importance of tobacco cessation  Next Prolia in March 1 per insurance to prevent osteoporosis from AI   Mammogram due in August 2019   Check US breast left to assess hard fixed mass along lumpectomy site     Pt is to continue arimidex to decrease risk of recurrence  Watch cholesterol closely : AI can increase such , cont statin    Calcium and vitamin D levels to be checked as well    cont lisinopril for HTN  Bisphosphonate counseling :  Dexa due now   Recheck elevated kappa chains as well  P vera stable secondary due to tobacco use   To DR Rodriguez for BP check   Cleared for prolia in March 2019   Bisphosphonate medication for osteoporosis (Actonel, Fosomax, etc) differ from Prolia (also know as Denosumab) in how they affect the activity of the osteoclast cells.  Osteoclast cells are the  of old bone.  The newly formed osteoclasts and the mature osteoclasts are the cells whose job it is to break down older bone.    Bisphosphonate medications bind directly to bone at sites of active bone breakdown and are then ingested by osteoclasts, the cells that breakdown bone.  When ingested, the osteoclast dies and bone breakdown is prevented.    Prolia is an immunoglobulin, a glycoprotein that works by binding to the cells that become osteoclasts.  When they bind to these cells they lead to the death of the osteoclasts cells and thus stops bone breakdown.  Studies that compare Prolia to bisphosphonates show that there are greater increases in bone mineral density (BMD) with Prolia    No dental extractions while on prolia      RTC for US breast results and dexa results  May be able to stop prolia if  dexa normal   Thank you for allowing me to participate in this pleasant patient's care. Please call with any questions or concerns.

## 2019-02-25 ENCOUNTER — HOSPITAL ENCOUNTER (OUTPATIENT)
Dept: RADIOLOGY | Facility: HOSPITAL | Age: 69
Discharge: HOME | End: 2019-02-25
Attending: INTERNAL MEDICINE
Payer: MEDICARE

## 2019-02-25 DIAGNOSIS — N63.20 LEFT BREAST MASS: ICD-10-CM

## 2019-02-25 DIAGNOSIS — M85.80 OSTEOPENIA, UNSPECIFIED LOCATION: ICD-10-CM

## 2019-02-25 DIAGNOSIS — C50.412 MALIGNANT NEOPLASM OF UPPER-OUTER QUADRANT OF LEFT FEMALE BREAST, UNSPECIFIED ESTROGEN RECEPTOR STATUS: ICD-10-CM

## 2019-02-25 DIAGNOSIS — Z79.811 VISIT FOR MONITORING ARIMIDEX THERAPY: ICD-10-CM

## 2019-02-25 DIAGNOSIS — Z51.81 ENCOUNTER FOR MONITORING BISPHOSPHONATE THERAPY: ICD-10-CM

## 2019-02-25 DIAGNOSIS — Z51.81 VISIT FOR MONITORING ARIMIDEX THERAPY: ICD-10-CM

## 2019-02-25 DIAGNOSIS — Z79.83 ENCOUNTER FOR MONITORING BISPHOSPHONATE THERAPY: ICD-10-CM

## 2019-02-25 PROCEDURE — 77080 DXA BONE DENSITY AXIAL: CPT | Mod: 26,,, | Performed by: RADIOLOGY

## 2019-02-25 PROCEDURE — 76641 ULTRASOUND BREAST COMPLETE: CPT | Mod: TC,LT

## 2019-02-25 PROCEDURE — 77080 DXA BONE DENSITY AXIAL: CPT | Mod: TC

## 2019-02-25 PROCEDURE — 76641 US BREAST LEFT COMPLETE: ICD-10-PCS | Mod: 26,LT,, | Performed by: RADIOLOGY

## 2019-02-25 PROCEDURE — 76641 ULTRASOUND BREAST COMPLETE: CPT | Mod: 26,LT,, | Performed by: RADIOLOGY

## 2019-02-25 PROCEDURE — 77080 DEXA BONE DENSITY SPINE HIP: ICD-10-PCS | Mod: 26,,, | Performed by: RADIOLOGY

## 2019-02-27 ENCOUNTER — OFFICE VISIT (OUTPATIENT)
Dept: HEMATOLOGY/ONCOLOGY | Facility: CLINIC | Age: 69
End: 2019-02-27
Payer: MEDICARE

## 2019-02-27 VITALS
HEIGHT: 61 IN | BODY MASS INDEX: 21.71 KG/M2 | RESPIRATION RATE: 16 BRPM | WEIGHT: 115 LBS | DIASTOLIC BLOOD PRESSURE: 72 MMHG | SYSTOLIC BLOOD PRESSURE: 111 MMHG | TEMPERATURE: 98 F | HEART RATE: 97 BPM

## 2019-02-27 DIAGNOSIS — F17.200 CURRENT SMOKER: ICD-10-CM

## 2019-02-27 DIAGNOSIS — Z79.83 ENCOUNTER FOR MONITORING BISPHOSPHONATE THERAPY: ICD-10-CM

## 2019-02-27 DIAGNOSIS — C50.912 MALIGNANT NEOPLASM OF LEFT FEMALE BREAST, UNSPECIFIED ESTROGEN RECEPTOR STATUS, UNSPECIFIED SITE OF BREAST: Primary | ICD-10-CM

## 2019-02-27 DIAGNOSIS — Z79.811 VISIT FOR MONITORING ARIMIDEX THERAPY: ICD-10-CM

## 2019-02-27 DIAGNOSIS — M85.80 OSTEOPENIA, UNSPECIFIED LOCATION: ICD-10-CM

## 2019-02-27 DIAGNOSIS — N63.20 BREAST MASS, LEFT: ICD-10-CM

## 2019-02-27 DIAGNOSIS — Z51.81 VISIT FOR MONITORING ARIMIDEX THERAPY: ICD-10-CM

## 2019-02-27 DIAGNOSIS — Z51.81 ENCOUNTER FOR MONITORING BISPHOSPHONATE THERAPY: ICD-10-CM

## 2019-02-27 DIAGNOSIS — D75.1 POLYCYTHEMIA, SECONDARY: ICD-10-CM

## 2019-02-27 PROCEDURE — 99215 PR OFFICE/OUTPT VISIT, EST, LEVL V, 40-54 MIN: ICD-10-PCS | Mod: S$PBB,,, | Performed by: INTERNAL MEDICINE

## 2019-02-27 PROCEDURE — 99999 PR PBB SHADOW E&M-EST. PATIENT-LVL III: CPT | Mod: PBBFAC,,, | Performed by: INTERNAL MEDICINE

## 2019-02-27 PROCEDURE — 99999 PR PBB SHADOW E&M-EST. PATIENT-LVL III: ICD-10-PCS | Mod: PBBFAC,,, | Performed by: INTERNAL MEDICINE

## 2019-02-27 PROCEDURE — 99213 OFFICE O/P EST LOW 20 MIN: CPT | Mod: PBBFAC | Performed by: INTERNAL MEDICINE

## 2019-02-27 PROCEDURE — 99215 OFFICE O/P EST HI 40 MIN: CPT | Mod: S$PBB,,, | Performed by: INTERNAL MEDICINE

## 2019-02-27 NOTE — PROGRESS NOTES
Subjective:       Patient ID: Arlene López is a 68 y.o. female.    Chief Complaint:I am due for my shot  HPI   Pt underwent a left breast lumpectomy for stage I T1b N0 M0 invasive lobular carcinoma.  Receptors are strongly positive for estrogen and negative for HER-2/hardeep.  She completed postlumpectomy radiation  She started arimidex in March of 2017 as adjuvant endocrine therapy. She will continue this until 2022  She has been olerating prolia to vazquez off early osteoporosis.  Pt has dense breasts and was sent for MRI which was unrevealing. SHe is tolerating prolia ( last one in August )  to prevent early onset osteoporosis from the aromatase inhibtor.  last dexa APril 2016. SHe had a bone scan AUgust 2018  to evaluate the bony prominence involving her sternum. This was negative except for a patchy finding in the left tibia. SHe has no pain , no unsteady gait no history of trauma to the left leg. She used to have pain in her left leg until she changed shows     Mammogram  At [facility] performed revealed heterogeneously dense tissue, slightly improved appearance of the postop seroma    Bone mineral density scan showed osteopenia for her hips and normal mineralization of her spine  MRI of breasts January 2018 reveal no evidence of recurrence January 2018, post op seroma stable   Here to review labs and for arimidex monitoring   Pt tolerated last prolia without complications  Feeling stable on lexapro for depression   Her cholesterol has been slightly above 200 and she is on a statin for such     Pt has undergone an ultrasound of her breast and the mass palpable in the LEFT UOQ id benign  Dexa reviewed : osteopenia     Due for prolia    Past Medical History:   Diagnosis Date    Cancer     breast    Full dentures     High cholesterol     HTN (hypertension)     Wears glasses          Current Outpatient Medications:     anastrozole (ARIMIDEX) 1 mg Tab, Take 1 tablet (1 mg total) by mouth once daily., Disp: 90 tablet,  "Rfl: 3    aspirin (ECOTRIN) 81 MG EC tablet, Take 1 tablet (81 mg total) by mouth once daily., Disp: 90 tablet, Rfl: 3    epinephrine (EPIPEN) 0.3 mg/0.3 mL AtIn, Inject 0.3 mLs (0.3 mg total) into the muscle once., Disp: 0.3 mL, Rfl: 0    escitalopram oxalate (LEXAPRO) 10 MG tablet, Take 1 tablet (10 mg total) by mouth once daily., Disp: 90 tablet, Rfl: 3    lisinopril 10 MG tablet, Take 1 tablet (10 mg total) by mouth once daily., Disp: 90 tablet, Rfl: 3    ofloxacin (OCUFLOX) 0.3 % ophthalmic solution, , Disp: , Rfl:     pravastatin (PRAVACHOL) 20 MG tablet, Take 1 tablet (20 mg total) by mouth once daily., Disp: 90 tablet, Rfl: 3    prednisoLONE acetate (PRED FORTE) 1 % DrpS, , Disp: , Rfl:     All medications and past history have been reviewed.  Review of Systems    CONSTITUTIONAL: No fevers, chills, night sweats, wt. loss, appetite changes  SKIN: no rashes or itching  ENT: No headaches, head trauma, no rhinorrhea  LYMPH NODES: None noticed   CV: No chest pain, palpitations.   RESP: No dyspnea on exertion,no cough no  wheezing  GI: No nausea, emesis, diarrhea, constipation, melena,  pain.   : No dysuria, hematuria, or frequency   HEME: No easy bruising, bleeding problems  PSYCHIATRIC: No depression, anxiety, psychosis, hallucinations.  NEURO: No seizures, memory loss, dizziness or difficulty sleeping  MSK: No arthralgias or joint swelling  Objective:       /72   Pulse 97   Temp 98 °F (36.7 °C) (Oral)   Resp 16   Ht 5' 1" (1.549 m)   Wt 52.2 kg (115 lb)   BMI 21.73 kg/m²     Physical Exam    Height / weight / VS reviewed  GENERAL.: Well-developed, well-nourished, in no acute distress  PSYCH: pleasant affect, no anxiety, no depression  HEENT: Normocephalic, lids intact, conjunctiva pink,  NECK: Supple,trachea midline, no palpable abnormalities  LYMPHATICS: No cervical or axillary adenopathy  RESPIRATORY: CTAB, no wheezes, rubs or rhonchi  Good respiratory effort without any retractions or " diaphragmatic movement  Breasts: no nipple discharge , left breast  mass   CARDIOVASCULAR: no JVD, S1 / S2 with RRR, no murmur, no rub  ABDOMEN: NTND, normal bowel sounds, no palpable HSM or mass  EXTREMITIES: No cyanosis, no clubbing, no edema, no joint effusion  NEUROLOGICAL: alert and oriented x 3, cranial nerves grossly intact  SKIN: Warm, dry, no rash or lesions noted,  no petechiae nno  tenting   Palpable rob prominence sternum, non mobile, sticking out of chest   All lab results and imaging results have been reviewed and discussed with the patient  Lab Results   Component Value Date    WBC 7.43 02/11/2019    HGB 16.4 (H) 02/11/2019    HCT 49.0 (H) 02/11/2019    MCV 96 02/11/2019     02/11/2019     CMP  Sodium   Date Value Ref Range Status   02/11/2019 134 (L) 136 - 145 mmol/L Final     Potassium   Date Value Ref Range Status   02/11/2019 4.1 3.5 - 5.1 mmol/L Final     Chloride   Date Value Ref Range Status   02/11/2019 96 95 - 110 mmol/L Final     CO2   Date Value Ref Range Status   02/11/2019 26 23 - 29 mmol/L Final     Glucose   Date Value Ref Range Status   02/11/2019 105 70 - 110 mg/dL Final     BUN, Bld   Date Value Ref Range Status   02/11/2019 9 8 - 23 mg/dL Final     Creatinine   Date Value Ref Range Status   02/11/2019 0.6 0.5 - 1.4 mg/dL Final     Calcium   Date Value Ref Range Status   02/11/2019 8.8 8.7 - 10.5 mg/dL Final     Total Protein   Date Value Ref Range Status   02/11/2019 7.1 6.0 - 8.4 g/dL Final     Albumin   Date Value Ref Range Status   02/11/2019 3.8 3.5 - 5.2 g/dL Final     Total Bilirubin   Date Value Ref Range Status   02/11/2019 0.6 0.1 - 1.0 mg/dL Final     Comment:     For infants and newborns, interpretation of results should be based  on gestational age, weight and in agreement with clinical  observations.  Premature Infant recommended reference ranges:  Up to 24 hours.............<8.0 mg/dL  Up to 48 hours............<12.0 mg/dL  3-5 days..................<15.0  mg/dL  6-29 days.................<15.0 mg/dL       Alkaline Phosphatase   Date Value Ref Range Status   02/11/2019 50 (L) 55 - 135 U/L Final     AST   Date Value Ref Range Status   02/11/2019 21 10 - 40 U/L Final     ALT   Date Value Ref Range Status   02/11/2019 12 10 - 44 U/L Final     Anion Gap   Date Value Ref Range Status   02/11/2019 12 8 - 16 mmol/L Final     eGFR if    Date Value Ref Range Status   02/11/2019 >60.0 >60 mL/min/1.73 m^2 Final     eGFR if non    Date Value Ref Range Status   02/11/2019 >60.0 >60 mL/min/1.73 m^2 Final     Comment:     Calculation used to obtain the estimated glomerular filtration  rate (eGFR) is the CKD-EPI equation.      CXR reviewed  DXA Bone Density Spine And Hip   Order: [id number]   Status:  Final result   Visible to patient:  Yes (Patient Portal) Next appt:  03/01/2019 at 08:30 AM in Infusion Therapy ([facility], OP THERAPEUTICS 2) Dx:  Encounter for monitoring bisphosphona...   Details     Reading Physician Reading Date Result Priority   Jerald Marroquin MD 2/25/2019       Narrative     EXAMINATION:  DEXA BONE DENSITY SPINE HIP    CLINICAL HISTORY:  history of osteoporosis; Encounter for therapeutic drug level monitoring    TECHNIQUE:  DXA scanning was performed over the left hip and lumbar spine.  Review of the images confirms satisfactory positioning and technique.    COMPARISON:  DEXA scan 09/18/2017.    FINDINGS:  The L1-L3 vertebral bone mineral density is equal to 1.081 g/cm squared with a T score of -0.7.  Prior BMD 1.083    The left femoral neck bone mineral density is equal to 0.752 g/cm squared with a T score of -2.1.  Prior BMD 0.753    The total hip bone mineral density is equal to 0.781 g/cm squared with a T score of -1.8.  Prior BMD 0.776    There is a 20.5% risk of a major osteoporotic fracture and a 9.6% risk of hip fracture in the next 10 years (FRAX).      Impression       Osteopenia.    Consider FDA approved medical therapies  in postmenopausal women and men aged 50 years and older, based on the following:    *A hip or vertebral (clinical or morphometric) fracture  *T score less than or equal to -2.5 at the femoral neck or spine after appropriate evaluation to exclude secondary causes.  *Low bone mass -- also known as osteopenia (T score between -1.0 and -2.5 at the femoral neck or spine) and a 10 year probability of hip fracture greater than or equal to 3% or a 10 year probability of major osteoporosis-related fracture greater than or equal to 20% based on the [place]-adapted WHO algorithm.  *Clinician's judgment and/or patient preference may indicate treatment for people with 10 year fracture probabilities is above or below these levels.      Electronically signed by: Jerald Marroquin  Date: 02/25/2019  Time: 09:18             Last Resulted: 02/25/19 09:18 Order Details View Encounter Lab and Collection Details Routing Result History            External Result Report    US Breast Left Complete   Order: [id number]   Status:  Final result   Visible to patient:  Yes (Patient Portal) Next appt:  03/01/2019 at 08:30 AM in Infusion Therapy ([facility], OP THERAPEUTICS 2) Dx:  Left breast mass; Malignant neoplasm ...   Study Mammography Recommendations      Side Status Due Date   Routine Screening Mammogram in 1 year Left Needs Follow-up 2/25/2020   Details     Reading Physician Reading Date Result Priority   Jerald Marroquin MD 2/25/2019 Routine      Physician Responsible for MQSA Outcome Reason    Jerald Marroquin MD Signed       Narrative & Impression     Result:   US Breast Left Complete     History:  Patient is 68 y.o. and is seen for a diagnostic mammogram.     Films Compared:  Prior images (if available) were compared.     Findings:     There is an oval, heterogeneous mass with circumscribed margins with enhancement seen in the upper outer quadrant of the left breast at 2 o'clock. The mass correlates with a palpable mass noted during physical  examination.  This mass lies adjacent to the patient's prior lumpectomy scar and is most consistent with a post surgical seroma/hematoma.     Impression:  There is no sonographic evidence of malignancy.     BI-RADS Category:   Left: 2 - Benign  Overall: 2 - Benign     Recommendation:  Routine screening mammogram in 1 year is recommended.                   Reviewed bone scan and mammogram and labs   Assessment:       1. Malignant neoplasm of left female breast, unspecified estrogen receptor status, unspecified site of breast    2. Visit for monitoring Arimidex therapy    3. Polycythemia, secondary    4. Encounter for monitoring bisphosphonate therapy    5. Osteopenia, unspecified location    6. Current smoker    7. Breast mass, left        Plan:         Malignant neoplasm of left female breast, unspecified estrogen receptor status, unspecified site of breast    Visit for monitoring Arimidex therapy    Polycythemia, secondary    Encounter for monitoring bisphosphonate therapy    Osteopenia, unspecified location    Current smoker    Breast mass, left             Cont lexapro which is keeping her anxiety stable      Next Prolia in March 1 per insurance to prevent osteoporosis from AI   Studies that compare Prolia to bisphosphonates show that there are greater increases in bone mineral density (BMD) with Prolia  Cleared for prolia every 6 months    Dexa with osteopenia  Cont prolia while on AI   Thank you for allowing me to participate in this pleasant patient's care. Please call with any questions or concerns.

## 2019-03-01 ENCOUNTER — INFUSION (OUTPATIENT)
Dept: INFUSION THERAPY | Facility: HOSPITAL | Age: 69
End: 2019-03-01
Attending: INTERNAL MEDICINE
Payer: MEDICARE

## 2019-03-01 VITALS
DIASTOLIC BLOOD PRESSURE: 68 MMHG | RESPIRATION RATE: 20 BRPM | SYSTOLIC BLOOD PRESSURE: 158 MMHG | HEART RATE: 84 BPM | TEMPERATURE: 97 F

## 2019-03-01 DIAGNOSIS — Z51.81 VISIT FOR MONITORING ARIMIDEX THERAPY: ICD-10-CM

## 2019-03-01 DIAGNOSIS — Z51.81 ENCOUNTER FOR MONITORING BISPHOSPHONATE THERAPY: Primary | ICD-10-CM

## 2019-03-01 DIAGNOSIS — M85.80 OSTEOPENIA, UNSPECIFIED LOCATION: ICD-10-CM

## 2019-03-01 DIAGNOSIS — Z79.811 VISIT FOR MONITORING ARIMIDEX THERAPY: ICD-10-CM

## 2019-03-01 DIAGNOSIS — Z79.83 ENCOUNTER FOR MONITORING BISPHOSPHONATE THERAPY: Primary | ICD-10-CM

## 2019-03-01 DIAGNOSIS — C50.912 MALIGNANT NEOPLASM OF LEFT FEMALE BREAST, UNSPECIFIED ESTROGEN RECEPTOR STATUS, UNSPECIFIED SITE OF BREAST: ICD-10-CM

## 2019-03-01 PROCEDURE — 96372 THER/PROPH/DIAG INJ SC/IM: CPT

## 2019-03-01 PROCEDURE — 63600175 PHARM REV CODE 636 W HCPCS: Mod: JG | Performed by: INTERNAL MEDICINE

## 2019-03-01 RX ADMIN — DENOSUMAB 60 MG: 60 INJECTION SUBCUTANEOUS at 08:03

## 2019-03-18 ENCOUNTER — HOSPITAL ENCOUNTER (OUTPATIENT)
Dept: RADIOLOGY | Facility: HOSPITAL | Age: 69
Discharge: HOME | End: 2019-03-18
Attending: INTERNAL MEDICINE
Payer: MEDICARE

## 2019-03-18 VITALS — BODY MASS INDEX: 21.34 KG/M2 | HEIGHT: 61 IN | WEIGHT: 113 LBS

## 2019-03-18 DIAGNOSIS — Z51.81 VISIT FOR MONITORING ARIMIDEX THERAPY: ICD-10-CM

## 2019-03-18 DIAGNOSIS — M85.80 OSTEOPENIA, UNSPECIFIED LOCATION: ICD-10-CM

## 2019-03-18 DIAGNOSIS — Z51.81 ENCOUNTER FOR MONITORING BISPHOSPHONATE THERAPY: ICD-10-CM

## 2019-03-18 DIAGNOSIS — Z79.83 ENCOUNTER FOR MONITORING BISPHOSPHONATE THERAPY: ICD-10-CM

## 2019-03-18 DIAGNOSIS — Z79.811 VISIT FOR MONITORING ARIMIDEX THERAPY: ICD-10-CM

## 2019-03-18 DIAGNOSIS — C50.912 MALIGNANT NEOPLASM OF LEFT FEMALE BREAST, UNSPECIFIED ESTROGEN RECEPTOR STATUS, UNSPECIFIED SITE OF BREAST: ICD-10-CM

## 2019-03-18 PROCEDURE — 77066 DX MAMMO INCL CAD BI: CPT | Mod: 26,,, | Performed by: RADIOLOGY

## 2019-03-18 PROCEDURE — 77066 MAMMO DIGITAL DIAGNOSTIC BILAT WITH CAD: ICD-10-PCS | Mod: 26,,, | Performed by: RADIOLOGY

## 2019-03-18 PROCEDURE — 77066 DX MAMMO INCL CAD BI: CPT | Mod: TC

## 2019-07-31 ENCOUNTER — TELEPHONE (OUTPATIENT)
Dept: HEMATOLOGY/ONCOLOGY | Facility: CLINIC | Age: 69
End: 2019-07-31

## 2019-07-31 NOTE — TELEPHONE ENCOUNTER
----- Message from Gricel Rodas sent at 7/31/2019  9:54 AM CDT -----  Contact: self  Type:  Sooner Apoointment Request    Caller is requesting a sooner appointment.  Caller declined first available appointment listed below.  Caller will not accept being placed on the waitlist and is requesting a message be sent to doctor.    Name of Caller:  self  When is the first available appointment?  08/28/19  Symptoms:  6 month check  Best Call Back Number:  [phone] (home)   Additional Information:  Patient would like to reschedule appointment. She prefers an AM appointment. Please call patient. Thanks!

## 2019-07-31 NOTE — TELEPHONE ENCOUNTER
Spoke to pt to r/s apt as requested. Pt confirmed apt date and time r/s and verbalized understanding.

## 2019-08-07 PROBLEM — Z85.3 HISTORY OF BREAST CANCER: Status: ACTIVE | Noted: 2019-08-07

## 2019-08-26 ENCOUNTER — LAB VISIT (OUTPATIENT)
Dept: LAB | Facility: HOSPITAL | Age: 69
End: 2019-08-26
Attending: INTERNAL MEDICINE
Payer: MEDICARE

## 2019-08-26 DIAGNOSIS — Z51.81 VISIT FOR MONITORING ARIMIDEX THERAPY: ICD-10-CM

## 2019-08-26 DIAGNOSIS — Z79.811 VISIT FOR MONITORING ARIMIDEX THERAPY: ICD-10-CM

## 2019-08-26 DIAGNOSIS — M85.80 OSTEOPENIA, UNSPECIFIED LOCATION: ICD-10-CM

## 2019-08-26 DIAGNOSIS — Z51.81 ENCOUNTER FOR MONITORING BISPHOSPHONATE THERAPY: ICD-10-CM

## 2019-08-26 DIAGNOSIS — Z79.83 ENCOUNTER FOR MONITORING BISPHOSPHONATE THERAPY: ICD-10-CM

## 2019-08-26 DIAGNOSIS — C50.912 MALIGNANT NEOPLASM OF LEFT FEMALE BREAST, UNSPECIFIED ESTROGEN RECEPTOR STATUS, UNSPECIFIED SITE OF BREAST: ICD-10-CM

## 2019-08-26 LAB
ALBUMIN SERPL BCP-MCNC: 3.9 G/DL (ref 3.5–5.2)
ALP SERPL-CCNC: 45 U/L (ref 55–135)
ALT SERPL W/O P-5'-P-CCNC: 18 U/L (ref 10–44)
ANION GAP SERPL CALC-SCNC: 13 MMOL/L (ref 8–16)
AST SERPL-CCNC: 30 U/L (ref 10–40)
BASOPHILS # BLD AUTO: 0.05 K/UL (ref 0–0.2)
BASOPHILS NFR BLD: 0.7 % (ref 0–1.9)
BILIRUB SERPL-MCNC: 0.9 MG/DL (ref 0.1–1)
BUN SERPL-MCNC: 6 MG/DL (ref 8–23)
CALCIUM SERPL-MCNC: 8.9 MG/DL (ref 8.7–10.5)
CHLORIDE SERPL-SCNC: 97 MMOL/L (ref 95–110)
CO2 SERPL-SCNC: 22 MMOL/L (ref 23–29)
CREAT SERPL-MCNC: 0.5 MG/DL (ref 0.5–1.4)
DIFFERENTIAL METHOD: ABNORMAL
EOSINOPHIL # BLD AUTO: 0.1 K/UL (ref 0–0.5)
EOSINOPHIL NFR BLD: 0.7 % (ref 0–8)
ERYTHROCYTE [DISTWIDTH] IN BLOOD BY AUTOMATED COUNT: 13.7 % (ref 11.5–14.5)
EST. GFR  (AFRICAN AMERICAN): >60 ML/MIN/1.73 M^2
EST. GFR  (NON AFRICAN AMERICAN): >60 ML/MIN/1.73 M^2
GLUCOSE SERPL-MCNC: 143 MG/DL (ref 70–110)
HCT VFR BLD AUTO: 49.1 % (ref 37–48.5)
HGB BLD-MCNC: 16.7 G/DL (ref 12–16)
IMM GRANULOCYTES # BLD AUTO: 0.02 K/UL (ref 0–0.04)
IMM GRANULOCYTES NFR BLD AUTO: 0.3 % (ref 0–0.5)
LYMPHOCYTES # BLD AUTO: 1.7 K/UL (ref 1–4.8)
LYMPHOCYTES NFR BLD: 22.7 % (ref 18–48)
MCH RBC QN AUTO: 32.9 PG (ref 27–31)
MCHC RBC AUTO-ENTMCNC: 34 G/DL (ref 32–36)
MCV RBC AUTO: 97 FL (ref 82–98)
MONOCYTES # BLD AUTO: 0.7 K/UL (ref 0.3–1)
MONOCYTES NFR BLD: 8.9 % (ref 4–15)
NEUTROPHILS # BLD AUTO: 5 K/UL (ref 1.8–7.7)
NEUTROPHILS NFR BLD: 66.7 % (ref 38–73)
NRBC BLD-RTO: 0 /100 WBC
PLATELET # BLD AUTO: 187 K/UL (ref 150–350)
PMV BLD AUTO: 9.8 FL (ref 9.2–12.9)
POTASSIUM SERPL-SCNC: 3.7 MMOL/L (ref 3.5–5.1)
PROT SERPL-MCNC: 6.9 G/DL (ref 6–8.4)
RBC # BLD AUTO: 5.07 M/UL (ref 4–5.4)
SODIUM SERPL-SCNC: 132 MMOL/L (ref 136–145)
WBC # BLD AUTO: 7.41 K/UL (ref 3.9–12.7)

## 2019-08-26 PROCEDURE — 86300 IMMUNOASSAY TUMOR CA 15-3: CPT

## 2019-08-26 PROCEDURE — 80053 COMPREHEN METABOLIC PANEL: CPT

## 2019-08-26 PROCEDURE — 85025 COMPLETE CBC W/AUTO DIFF WBC: CPT

## 2019-08-28 LAB — CANCER AG15-3 SERPL-ACNC: 19 U/ML (ref 0–31)

## 2019-09-04 ENCOUNTER — TELEPHONE (OUTPATIENT)
Dept: HEMATOLOGY/ONCOLOGY | Facility: CLINIC | Age: 69
End: 2019-09-04

## 2019-09-04 NOTE — TELEPHONE ENCOUNTER
----- Message from Sushila Glynn sent at 9/4/2019 11:57 AM CDT -----  Type: Needs Medical Advice    Who Called:  Patient  Best Call Back Number: [phone] (home)     Additional Information: Missed her appointment today and needs to reschedule. Please call with availability.

## 2019-09-05 ENCOUNTER — INFUSION (OUTPATIENT)
Dept: INFUSION THERAPY | Facility: HOSPITAL | Age: 69
End: 2019-09-05
Attending: INTERNAL MEDICINE
Payer: MEDICARE

## 2019-09-05 ENCOUNTER — TELEPHONE (OUTPATIENT)
Dept: HEMATOLOGY/ONCOLOGY | Facility: CLINIC | Age: 69
End: 2019-09-05

## 2019-09-05 VITALS
SYSTOLIC BLOOD PRESSURE: 158 MMHG | OXYGEN SATURATION: 96 % | DIASTOLIC BLOOD PRESSURE: 60 MMHG | WEIGHT: 106 LBS | RESPIRATION RATE: 18 BRPM | BODY MASS INDEX: 20.03 KG/M2 | TEMPERATURE: 98 F | HEART RATE: 80 BPM

## 2019-09-05 DIAGNOSIS — Z51.81 VISIT FOR MONITORING ARIMIDEX THERAPY: ICD-10-CM

## 2019-09-05 DIAGNOSIS — Z79.83 ENCOUNTER FOR MONITORING BISPHOSPHONATE THERAPY: Primary | ICD-10-CM

## 2019-09-05 DIAGNOSIS — Z79.811 VISIT FOR MONITORING ARIMIDEX THERAPY: ICD-10-CM

## 2019-09-05 DIAGNOSIS — M85.80 OSTEOPENIA, UNSPECIFIED LOCATION: ICD-10-CM

## 2019-09-05 DIAGNOSIS — C50.912 MALIGNANT NEOPLASM OF LEFT FEMALE BREAST, UNSPECIFIED ESTROGEN RECEPTOR STATUS, UNSPECIFIED SITE OF BREAST: ICD-10-CM

## 2019-09-05 DIAGNOSIS — Z51.81 ENCOUNTER FOR MONITORING BISPHOSPHONATE THERAPY: Primary | ICD-10-CM

## 2019-09-05 PROCEDURE — 96372 THER/PROPH/DIAG INJ SC/IM: CPT

## 2019-09-05 PROCEDURE — 63600175 PHARM REV CODE 636 W HCPCS: Mod: JG | Performed by: INTERNAL MEDICINE

## 2019-09-05 RX ADMIN — DENOSUMAB 60 MG: 60 INJECTION SUBCUTANEOUS at 08:09

## 2019-09-05 NOTE — TELEPHONE ENCOUNTER
LM on VM advising patient to please call back to reschedule appointment.    ----- Message from Jovanny Ryder sent at 9/4/2019  4:54 PM CDT -----  Contact: pt's   Dalton  Type:  Patient Returning Call    Who Called:  Dalton  Who Left Message for Patient:  Nancie  Does the patient know what this is regarding?:  R/s appointment  Best Call Back Number:  [phone]  Additional Information:

## 2019-09-05 NOTE — NURSING
"0830 Pt ambulatory to infusion chair 11. Pt reports feeling good this morning with no complaints or questions regarding treatment plan. Pt denies need for medication education stating "I've been on this for years." Vital signs obtained and stable. Awaiting medication to arrive. Ds  0844 Prolia 60 mg given sq to upper arm. Pt tolerated well. No swelling,redness, or pain noted. DS  0847 Pt discharged with no signs of medication reaction. Pt provided copy of future appointment and prolia handout. DS  "

## 2019-09-09 ENCOUNTER — TELEPHONE (OUTPATIENT)
Dept: HEMATOLOGY/ONCOLOGY | Facility: CLINIC | Age: 69
End: 2019-09-09

## 2019-09-09 NOTE — TELEPHONE ENCOUNTER
----- Message from Karel Perez sent at 9/9/2019  3:38 PM CDT -----  Contact: patient  Type: Needs Medical Advice    Who Called:  patient  Symptoms (please be specific):    How long has patient had these symptoms:    Pharmacy name and phone #:    Best Call Back Number: [phone]  Additional Information: requesting a call back to schedule appointment

## 2019-10-02 ENCOUNTER — OFFICE VISIT (OUTPATIENT)
Dept: HEMATOLOGY/ONCOLOGY | Facility: CLINIC | Age: 69
End: 2019-10-02
Payer: MEDICARE

## 2019-10-02 VITALS
BODY MASS INDEX: 20.39 KG/M2 | TEMPERATURE: 98 F | RESPIRATION RATE: 18 BRPM | WEIGHT: 108 LBS | DIASTOLIC BLOOD PRESSURE: 77 MMHG | SYSTOLIC BLOOD PRESSURE: 178 MMHG | OXYGEN SATURATION: 97 % | HEIGHT: 61 IN | HEART RATE: 81 BPM

## 2019-10-02 DIAGNOSIS — E87.1 HYPONATREMIA: ICD-10-CM

## 2019-10-02 DIAGNOSIS — D75.1 ERYTHROCYTOSIS: ICD-10-CM

## 2019-10-02 DIAGNOSIS — R79.9 ABNORMAL FINDING OF BLOOD CHEMISTRY, UNSPECIFIED: ICD-10-CM

## 2019-10-02 DIAGNOSIS — Z51.81 ENCOUNTER FOR MONITORING BISPHOSPHONATE THERAPY: ICD-10-CM

## 2019-10-02 DIAGNOSIS — M85.80 OSTEOPENIA, UNSPECIFIED LOCATION: ICD-10-CM

## 2019-10-02 DIAGNOSIS — R63.5 WEIGHT GAIN: ICD-10-CM

## 2019-10-02 DIAGNOSIS — Z79.811 VISIT FOR MONITORING ARIMIDEX THERAPY: ICD-10-CM

## 2019-10-02 DIAGNOSIS — Z17.0 ESTROGEN RECEPTOR POSITIVE: ICD-10-CM

## 2019-10-02 DIAGNOSIS — C50.912 MALIGNANT NEOPLASM OF LEFT FEMALE BREAST, UNSPECIFIED ESTROGEN RECEPTOR STATUS, UNSPECIFIED SITE OF BREAST: Primary | ICD-10-CM

## 2019-10-02 DIAGNOSIS — Z79.83 ENCOUNTER FOR MONITORING BISPHOSPHONATE THERAPY: ICD-10-CM

## 2019-10-02 DIAGNOSIS — R03.0 ELEVATED BLOOD PRESSURE READING: ICD-10-CM

## 2019-10-02 DIAGNOSIS — Z51.81 VISIT FOR MONITORING ARIMIDEX THERAPY: ICD-10-CM

## 2019-10-02 DIAGNOSIS — R92.30 DENSE BREASTS: ICD-10-CM

## 2019-10-02 PROCEDURE — 99999 PR PBB SHADOW E&M-EST. PATIENT-LVL III: ICD-10-PCS | Mod: PBBFAC,,, | Performed by: INTERNAL MEDICINE

## 2019-10-02 PROCEDURE — 99215 OFFICE O/P EST HI 40 MIN: CPT | Mod: S$PBB,,, | Performed by: INTERNAL MEDICINE

## 2019-10-02 PROCEDURE — 99999 PR PBB SHADOW E&M-EST. PATIENT-LVL III: CPT | Mod: PBBFAC,,, | Performed by: INTERNAL MEDICINE

## 2019-10-02 PROCEDURE — 99213 OFFICE O/P EST LOW 20 MIN: CPT | Mod: PBBFAC | Performed by: INTERNAL MEDICINE

## 2019-10-02 PROCEDURE — 99215 PR OFFICE/OUTPT VISIT, EST, LEVL V, 40-54 MIN: ICD-10-PCS | Mod: S$PBB,,, | Performed by: INTERNAL MEDICINE

## 2019-10-02 RX ORDER — EPINEPHRINE 0.3 MG/.3ML
INJECTION INTRAMUSCULAR
COMMUNITY
Start: 2019-08-21 | End: 2023-05-25

## 2019-10-02 NOTE — LETTER
October 2, 2019      Main Rodriguez III, MD  [address] [place] [address]           [facility]  [address] [place] [address]  Phone: [phone]          Patient: Arlene López   MR Number: [id number]   YOB: 1950   Date of Visit: 10/2/2019       Dear Dr. Main Rodriguez III:    Thank you for referring Arlene López to me for evaluation. Attached you will find relevant portions of my assessment and plan of care.    If you have questions, please do not hesitate to call me. I look forward to following Arlene López along with you.    Sincerely,    Lisette Aguila MD    Enclosure  CC:  No Recipients    If you would like to receive this communication electronically, please contact [email or web address] or [phone] to request more information on [employer] Link access.    For providers and/or their staff who would like to refer a patient to [facility], please contact us through our one-stop-shop provider referral line, [facility], at [phone].    If you feel you have received this communication in error or would no longer like to receive these types of communications, please e-mail [email or web address]

## 2019-10-02 NOTE — PROGRESS NOTES
Subjective:       Patient ID: Arlene López is a 69 y.o. female.    Chief Complaint    HPI   Pt underwent a left breast lumpectomy for stage I T1b N0 M0 invasive lobular carcinoma.  Receptors are strongly positive for estrogen and negative for HER-2/hardeep.  She completed postlumpectomy radiation  She started arimidex in March of 2017 as adjuvant endocrine therapy. She will continue this until 2022  She has been olerating prolia to avzquez off early osteoporosis.  Pt has dense breasts and was sent for MRI which was unrevealing. SHe is tolerating prolia ( last one in August )  to prevent early onset osteoporosis from the aromatase inhibtor.  last dexa APril 2016. SHe had a bone scan AUgust 2018  to evaluate the bony prominence involving her sternum. This was negative except for a patchy finding in the left tibia. SHe has no pain , no unsteady gait no history of trauma to the left leg. She used to have pain in her left leg until she changed shows     Mammogram  At [place] performed revealed heterogeneously dense tissue, slightly improved appearance of the postop seroma    Bone mineral density scan showed osteopenia for her hips and normal mineralization of her spine  MRI of breasts January 2018 reveal no evidence of recurrence January 2018, post op seroma stable   Here to review labs and for arimidex monitoring   Pt tolerated last prolia without complications  Feeling stable on lexapro for depression   Her cholesterol has been slightly above 200 and she is on a statin for such     Pt has undergone an ultrasound of her breast and the mass palpable in the LEFT UOQ id benign  Dexa reviewed : osteopenia   Due for prolia    Past Medical History:   Diagnosis Date    Breast cancer 2016    left breast CA    Cancer     breast    Full dentures     High cholesterol     HTN (hypertension)     Wears glasses          Current Outpatient Medications:     anastrozole (ARIMIDEX) 1 mg Tab, Take 1 tablet (1 mg total) by mouth once  "daily., Disp: 90 tablet, Rfl: 3    aspirin (ECOTRIN) 81 MG EC tablet, Take 1 tablet (81 mg total) by mouth once daily., Disp: 90 tablet, Rfl: 3    EPIPEN 2-BLANCA 0.3 mg/0.3 mL AtIn, , Disp: , Rfl:     lisinopril 10 MG tablet, Take 1 tablet (10 mg total) by mouth once daily., Disp: 90 tablet, Rfl: 3    pravastatin (PRAVACHOL) 40 MG tablet, Take 1 tablet (40 mg total) by mouth once daily. In the afternoon, Disp: 90 tablet, Rfl: 3    prednisoLONE acetate (PRED FORTE) 1 % DrpS, , Disp: , Rfl:     escitalopram oxalate (LEXAPRO) 10 MG tablet, Take 1 tablet (10 mg total) by mouth once daily., Disp: 90 tablet, Rfl: 3    All medications and past history have been reviewed.  Review of Systems    CONSTITUTIONAL: No fevers, chills, night sweats, wt. loss, appetite changes  SKIN: no rashes or itching  ENT: No headaches, head trauma, no rhinorrhea  LYMPH NODES: None noticed   CV: No chest pain, palpitations.   RESP: No dyspnea on exertion,no cough no  wheezing  GI: No nausea, emesis, diarrhea, constipation, melena,  pain.   : No dysuria, hematuria, or frequency   HEME: No easy bruising, bleeding problems  PSYCHIATRIC: No depression, anxiety, psychosis, hallucinations.  NEURO: No seizures, memory loss, dizziness or difficulty sleeping  MSK: No arthralgias or joint swelling  Objective:       BP (!) 178/77   Pulse 81   Temp 98.4 °F (36.9 °C) (Oral)   Resp 18   Ht 5' 1" (1.549 m)   Wt 49 kg (108 lb)   SpO2 97%   BMI 20.41 kg/m²     Physical Exam    Height / weight / VS reviewed  GENERAL.: Well-developed, well-nourished, in no acute distress  PSYCH: pleasant affect, no anxiety, no depression  HEENT: Normocephalic, lids intact, conjunctiva pink,  NECK: Supple,trachea midline, no palpable abnormalities  LYMPHATICS: No cervical or axillary adenopathy  RESPIRATORY: CTAB, no wheezes, rubs or rhonchi  Good respiratory effort without any retractions or diaphragmatic movement  Breasts: no nipple discharge , left breast  mass "   CARDIOVASCULAR: no JVD, S1 / S2 with RRR, no murmur, no rub  ABDOMEN: NTND, normal bowel sounds, no palpable HSM or mass  EXTREMITIES: No cyanosis, no clubbing, no edema, no joint effusion  NEUROLOGICAL: alert and oriented x 3, cranial nerves grossly intact  SKIN: Warm, dry, no rash or lesions noted,  no petechiae nno  tenting   Palpable rob prominence sternum, non mobile, sticking out of chest   All lab results and imaging results have been reviewed and discussed with the patient  Lab Results   Component Value Date    WBC 7.41 08/26/2019    HGB 16.7 (H) 08/26/2019    HCT 49.1 (H) 08/26/2019    MCV 97 08/26/2019     08/26/2019     CMP  Sodium   Date Value Ref Range Status   08/26/2019 132 (L) 136 - 145 mmol/L Final     Potassium   Date Value Ref Range Status   08/26/2019 3.7 3.5 - 5.1 mmol/L Final     Chloride   Date Value Ref Range Status   08/26/2019 97 95 - 110 mmol/L Final     CO2   Date Value Ref Range Status   08/26/2019 22 (L) 23 - 29 mmol/L Final     Glucose   Date Value Ref Range Status   08/26/2019 143 (H) 70 - 110 mg/dL Final     BUN, Bld   Date Value Ref Range Status   08/26/2019 6 (L) 8 - 23 mg/dL Final     Creatinine   Date Value Ref Range Status   08/26/2019 0.5 0.5 - 1.4 mg/dL Final     Calcium   Date Value Ref Range Status   08/26/2019 8.9 8.7 - 10.5 mg/dL Final     Total Protein   Date Value Ref Range Status   08/26/2019 6.9 6.0 - 8.4 g/dL Final     Albumin   Date Value Ref Range Status   08/26/2019 3.9 3.5 - 5.2 g/dL Final     Total Bilirubin   Date Value Ref Range Status   08/26/2019 0.9 0.1 - 1.0 mg/dL Final     Comment:     For infants and newborns, interpretation of results should be based  on gestational age, weight and in agreement with clinical  observations.  Premature Infant recommended reference ranges:  Up to 24 hours.............<8.0 mg/dL  Up to 48 hours............<12.0 mg/dL  3-5 days..................<15.0 mg/dL  6-29 days.................<15.0 mg/dL       Alkaline  Phosphatase   Date Value Ref Range Status   08/26/2019 45 (L) 55 - 135 U/L Final     AST   Date Value Ref Range Status   08/26/2019 30 10 - 40 U/L Final     ALT   Date Value Ref Range Status   08/26/2019 18 10 - 44 U/L Final     Anion Gap   Date Value Ref Range Status   08/26/2019 13 8 - 16 mmol/L Final     eGFR if    Date Value Ref Range Status   08/26/2019 >60.0 >60 mL/min/1.73 m^2 Final     eGFR if non    Date Value Ref Range Status   08/26/2019 >60.0 >60 mL/min/1.73 m^2 Final     Comment:     Calculation used to obtain the estimated glomerular filtration  rate (eGFR) is the CKD-EPI equation.      CXR reviewed  DXA Bone Density Spine And Hip   Order: [id number]   Status:  Final result   Visible to patient:  Yes (Patient Portal) Next appt:  03/01/2019 at 08:30 AM in Infusion Therapy ([facility], OP THERAPEUTICS 2) Dx:  Encounter for monitoring bisphosphona...   Details     Reading Physician Reading Date Result Priority   Jerald Marroquin MD 2/25/2019       Narrative     EXAMINATION:  DEXA BONE DENSITY SPINE HIP    CLINICAL HISTORY:  history of osteoporosis; Encounter for therapeutic drug level monitoring    TECHNIQUE:  DXA scanning was performed over the left hip and lumbar spine.  Review of the images confirms satisfactory positioning and technique.    COMPARISON:  DEXA scan 09/18/2017.    FINDINGS:  The L1-L3 vertebral bone mineral density is equal to 1.081 g/cm squared with a T score of -0.7.  Prior BMD 1.083    The left femoral neck bone mineral density is equal to 0.752 g/cm squared with a T score of -2.1.  Prior BMD 0.753    The total hip bone mineral density is equal to 0.781 g/cm squared with a T score of -1.8.  Prior BMD 0.776    There is a 20.5% risk of a major osteoporotic fracture and a 9.6% risk of hip fracture in the next 10 years (FRAX).      Impression       Osteopenia.    Consider FDA approved medical therapies in postmenopausal women and men aged 50 years and older,  based on the following:    *A hip or vertebral (clinical or morphometric) fracture  *T score less than or equal to -2.5 at the femoral neck or spine after appropriate evaluation to exclude secondary causes.  *Low bone mass -- also known as osteopenia (T score between -1.0 and -2.5 at the femoral neck or spine) and a 10 year probability of hip fracture greater than or equal to 3% or a 10 year probability of major osteoporosis-related fracture greater than or equal to 20% based on the [place]-adapted WHO algorithm.  *Clinician's judgment and/or patient preference may indicate treatment for people with 10 year fracture probabilities is above or below these levels.      Electronically signed by: Jerald Marroquin  Date: 02/25/2019  Time: 09:18             Last Resulted: 02/25/19 09:18 Order Details View Encounter Lab and Collection Details Routing Result History            External Result Report    US Breast Left Complete   Order: [id number]   Status:  Final result   Visible to patient:  Yes (Patient Portal) Next appt:  03/01/2019 at 08:30 AM in Infusion Therapy ([facility], OP THERAPEUT[facility] 2) Dx:  Left breast mass; Malignant neoplasm ...   Study Mammography Recommendations      Side Status Due Date   Routine Screening Mammogram in 1 year Left Needs Follow-up 2/25/2020   Details     Reading Physician Reading Date Result Priority   Jerald Marroquin MD 2/25/2019 Routine      Physician Responsible for MQSA Outcome Reason    Jerald Marroquin MD Signed       Narrative & Impression     Result:   US Breast Left Complete     History:  Patient is 68 y.o. and is seen for a diagnostic mammogram.     Films Compared:  Prior images (if available) were compared.     Findings:     There is an oval, heterogeneous mass with circumscribed margins with enhancement seen in the upper outer quadrant of the left breast at 2 o'clock. The mass correlates with a palpable mass noted during physical examination.  This mass lies adjacent to the patient's prior  lumpectomy scar and is most consistent with a post surgical seroma/hematoma.     Impression:  There is no sonographic evidence of malignancy.     BI-RADS Category:   Left: 2 - Benign  Overall: 2 - Benign     Recommendation:  Routine screening mammogram in 1 year is recommended.                   Reviewed bone scan and mammogram and labs   Assessment:       1. Malignant neoplasm of left female breast, unspecified estrogen receptor status, unspecified site of breast    2. Visit for monitoring Arimidex therapy    3. Osteopenia, unspecified location    4. Encounter for monitoring bisphosphonate therapy    5. Estrogen receptor positive    6. Dense breasts    7. Weight gain    8. Elevated blood pressure reading    9. Hyponatremia    10. Erythrocytosis    11. Abnormal finding of blood chemistry, unspecified         Plan:         Malignant neoplasm of left female breast, unspecified estrogen receptor status, unspecified site of breast  -     Calcitriol; Future; Expected date: 10/02/2019  -     CBC auto differential; Future; Expected date: 10/02/2019  -     Comprehensive metabolic panel; Future; Expected date: 10/02/2019  -     Lipid panel; Future; Expected date: 10/02/2019  -     Cancer antigen 15-3; Future; Expected date: 10/02/2019  -     Mammo Digital Diagnostic Bilateral; Future; Expected date: 03/03/2020    Visit for monitoring Arimidex therapy  -     Calcitriol; Future; Expected date: 10/02/2019  -     CBC auto differential; Future; Expected date: 10/02/2019  -     Comprehensive metabolic panel; Future; Expected date: 10/02/2019  -     Lipid panel; Future; Expected date: 10/02/2019  -     Cancer antigen 15-3; Future; Expected date: 10/02/2019  -     Mammo Digital Diagnostic Bilateral; Future; Expected date: 03/03/2020    Osteopenia, unspecified location  -     Calcitriol; Future; Expected date: 10/02/2019  -     Lipid panel; Future; Expected date: 10/02/2019    Encounter for monitoring bisphosphonate therapy  -      Calcitriol; Future; Expected date: 10/02/2019  -     Lipid panel; Future; Expected date: 10/02/2019  -     Mammo Digital Diagnostic Bilateral; Future; Expected date: 03/03/2020    Estrogen receptor positive  -     Calcitriol; Future; Expected date: 10/02/2019    Dense breasts  -     Lipid panel; Future; Expected date: 10/02/2019    Weight gain  -     Calcitriol; Future; Expected date: 10/02/2019  -     Lipid panel; Future; Expected date: 10/02/2019    Elevated blood pressure reading  -     Calcitriol; Future; Expected date: 10/02/2019  -     Lipid panel; Future; Expected date: 10/02/2019    Hyponatremia  -     Calcitriol; Future; Expected date: 10/02/2019  -     Lipid panel; Future; Expected date: 10/02/2019    Erythrocytosis  -     Calcitriol; Future; Expected date: 10/02/2019  -     Lipid panel; Future; Expected date: 10/02/2019    Abnormal finding of blood chemistry, unspecified   -     Lipid panel; Future; Expected date: 10/02/2019           Cont lexapro which is keeping her anxiety stable  Next Prolia in March 1 per insurance to prevent osteoporosis from AI   Studies that compare Prolia to bisphosphonates show that there are greater increases in bone mineral density (BMD) with Prolia  Cleared for prolia every 6 months  [facility] due at [facility] next week   Increase sodium   Dexa with osteopenia  Cont prolia while on AI   Cont calcium and Vitamin D   Thank you for allowing me to participate in this pleasant patient's care. Please call with any questions or concerns.

## 2019-10-07 DIAGNOSIS — R45.89 DYSPHORIC MOOD: ICD-10-CM

## 2019-10-07 DIAGNOSIS — F41.9 ANXIETY: ICD-10-CM

## 2019-10-07 NOTE — TELEPHONE ENCOUNTER
----- Message from Celytreva Saleh sent at 10/7/2019 10:49 AM CDT -----  Contact: pt  Type:  RX Refill Request    Who Called:  pt  Refill or New Rx:  refill  RX Name and Strength:  escitalopram oxalate (LEXAPRO) 10 MG tablet 90 tablet   How is the patient currently taking it? (ex. 1XDay):  1xday  Is this a 30 day or 90 day RX:  90  Preferred Pharmacy with phone number:    Local or Mail Order:  local  Ordering Provider:  Dr.Meng Montelongo Call Back Number:  [phone]  Additional Information:  Requesting refill for today

## 2019-10-09 DIAGNOSIS — R45.89 DYSPHORIC MOOD: ICD-10-CM

## 2019-10-09 DIAGNOSIS — F41.9 ANXIETY: ICD-10-CM

## 2019-10-09 RX ORDER — ESCITALOPRAM OXALATE 10 MG/1
10 TABLET ORAL DAILY
Qty: 90 TABLET | Refills: 3 | Status: SHIPPED | OUTPATIENT
Start: 2019-10-09 | End: 2020-11-02 | Stop reason: SDUPTHER

## 2019-10-09 RX ORDER — ESCITALOPRAM OXALATE 10 MG/1
10 TABLET ORAL DAILY
Qty: 90 TABLET | Refills: 3 | OUTPATIENT
Start: 2019-10-09 | End: 2020-10-08

## 2019-10-17 PROBLEM — Z98.890 HISTORY OF CEA (CAROTID ENDARTERECTOMY): Status: ACTIVE | Noted: 2019-10-17

## 2019-10-23 DIAGNOSIS — C50.919 MALIGNANT NEOPLASM OF FEMALE BREAST, UNSPECIFIED ESTROGEN RECEPTOR STATUS, UNSPECIFIED LATERALITY, UNSPECIFIED SITE OF BREAST: ICD-10-CM

## 2019-10-23 RX ORDER — ANASTROZOLE 1 MG/1
1 TABLET ORAL DAILY
Qty: 90 TABLET | Refills: 0 | Status: SHIPPED | OUTPATIENT
Start: 2019-10-23 | End: 2020-04-02 | Stop reason: SDUPTHER

## 2019-10-23 NOTE — TELEPHONE ENCOUNTER
----- Message from Rita Potts sent at 10/23/2019  9:14 AM CDT -----  Contact: patient  Type:  RX Refill Request    Who Called:  patient  Refill or New Rx:  refill  RX Name and Strength:  anastrozole (ARIMIDEX) 1 mg Tab  How is the patient currently taking it? (ex. 1XDay):  1XDAY  Is this a 30 day or 90 day RX:  90  Preferred Pharmacy with phone number:  [facility] [phone]  Local or Mail Order:  Local  Best Call Back Number:  [phone]

## 2019-12-12 NOTE — TELEPHONE ENCOUNTER
----- Message from Gricel Rodas sent at 10/9/2019  1:16 PM CDT -----  Contact: self  Type:  RX Refill Request    Who Called:  self  Refill or New Rx:  refill  RX Name and Strength:  escitalopram oxalate (LEXAPRO) 10 MG tablet  How is the patient currently taking it? (ex. 1XDay):  1xday  Is this a 30 day or 90 day RX:  90  Preferred Pharmacy with phone number:    [facility] [place] [place] - [address]  [address] [address]  [address] [place] [address]  Phone: [phone] Fax: [phone]  Local or Mail Order:  local  Ordering Provider:  Dr Mingo Montelongo Call Back Number:  [phone] (home)   Additional Information:  Patient states she called pharmacy but they have not received approval. Please call patient. Thanks!    
[FreeTextEntry1] : \par Has had GERD for many years\par \par Reviewed [facility] on biopsies

## 2020-02-26 ENCOUNTER — HOSPITAL ENCOUNTER (OUTPATIENT)
Dept: RADIOLOGY | Facility: HOSPITAL | Age: 70
Discharge: HOME | End: 2020-02-26
Attending: INTERNAL MEDICINE
Payer: MEDICARE

## 2020-02-26 VITALS — BODY MASS INDEX: 19.57 KG/M2 | HEIGHT: 61 IN | WEIGHT: 103.63 LBS

## 2020-02-26 DIAGNOSIS — Z79.83 ENCOUNTER FOR MONITORING BISPHOSPHONATE THERAPY: ICD-10-CM

## 2020-02-26 DIAGNOSIS — C50.912 MALIGNANT NEOPLASM OF LEFT FEMALE BREAST, UNSPECIFIED ESTROGEN RECEPTOR STATUS, UNSPECIFIED SITE OF BREAST: ICD-10-CM

## 2020-02-26 DIAGNOSIS — Z51.81 ENCOUNTER FOR MONITORING BISPHOSPHONATE THERAPY: ICD-10-CM

## 2020-02-26 DIAGNOSIS — Z51.81 VISIT FOR MONITORING ARIMIDEX THERAPY: ICD-10-CM

## 2020-02-26 DIAGNOSIS — Z79.811 VISIT FOR MONITORING ARIMIDEX THERAPY: ICD-10-CM

## 2020-02-26 PROCEDURE — 77066 DX MAMMO INCL CAD BI: CPT | Mod: TC

## 2020-02-26 PROCEDURE — 77062 MAMMO DIGITAL DIAGNOSTIC BILAT WITH TOMOSYNTHESIS_CAD: ICD-10-PCS | Mod: 26,,, | Performed by: RADIOLOGY

## 2020-02-26 PROCEDURE — 77066 MAMMO DIGITAL DIAGNOSTIC BILAT WITH TOMOSYNTHESIS_CAD: ICD-10-PCS | Mod: 26,,, | Performed by: RADIOLOGY

## 2020-02-26 PROCEDURE — 77062 BREAST TOMOSYNTHESIS BI: CPT | Mod: 26,,, | Performed by: RADIOLOGY

## 2020-02-26 PROCEDURE — 77066 DX MAMMO INCL CAD BI: CPT | Mod: 26,,, | Performed by: RADIOLOGY

## 2020-03-30 ENCOUNTER — TELEPHONE (OUTPATIENT)
Dept: HEMATOLOGY/ONCOLOGY | Facility: CLINIC | Age: 70
End: 2020-03-30

## 2020-03-30 NOTE — TELEPHONE ENCOUNTER
Pt requesting appt daniela Potter to discuss refills and care. Pt states she does not have a smart phone, so she is requesting a phone call.

## 2020-03-31 ENCOUNTER — LAB VISIT (OUTPATIENT)
Dept: LAB | Facility: HOSPITAL | Age: 70
End: 2020-03-31
Attending: INTERNAL MEDICINE
Payer: MEDICARE

## 2020-03-31 DIAGNOSIS — R63.5 WEIGHT GAIN: ICD-10-CM

## 2020-03-31 DIAGNOSIS — R92.30 DENSE BREASTS: ICD-10-CM

## 2020-03-31 DIAGNOSIS — C50.912 MALIGNANT NEOPLASM OF LEFT FEMALE BREAST, UNSPECIFIED ESTROGEN RECEPTOR STATUS, UNSPECIFIED SITE OF BREAST: ICD-10-CM

## 2020-03-31 DIAGNOSIS — M85.80 OSTEOPENIA, UNSPECIFIED LOCATION: ICD-10-CM

## 2020-03-31 DIAGNOSIS — R79.9 ABNORMAL FINDING OF BLOOD CHEMISTRY, UNSPECIFIED: ICD-10-CM

## 2020-03-31 DIAGNOSIS — E87.1 HYPONATREMIA: ICD-10-CM

## 2020-03-31 DIAGNOSIS — D75.1 ERYTHROCYTOSIS: ICD-10-CM

## 2020-03-31 DIAGNOSIS — R03.0 ELEVATED BLOOD PRESSURE READING: ICD-10-CM

## 2020-03-31 DIAGNOSIS — Z51.81 ENCOUNTER FOR MONITORING BISPHOSPHONATE THERAPY: ICD-10-CM

## 2020-03-31 DIAGNOSIS — Z79.83 ENCOUNTER FOR MONITORING BISPHOSPHONATE THERAPY: ICD-10-CM

## 2020-03-31 DIAGNOSIS — Z79.811 VISIT FOR MONITORING ARIMIDEX THERAPY: ICD-10-CM

## 2020-03-31 DIAGNOSIS — Z17.0 ESTROGEN RECEPTOR POSITIVE: ICD-10-CM

## 2020-03-31 DIAGNOSIS — Z51.81 VISIT FOR MONITORING ARIMIDEX THERAPY: ICD-10-CM

## 2020-03-31 LAB
ALBUMIN SERPL BCP-MCNC: 3.8 G/DL (ref 3.5–5.2)
ALP SERPL-CCNC: 72 U/L (ref 55–135)
ALT SERPL W/O P-5'-P-CCNC: 13 U/L (ref 10–44)
ANION GAP SERPL CALC-SCNC: 11 MMOL/L (ref 8–16)
AST SERPL-CCNC: 26 U/L (ref 10–40)
BASOPHILS # BLD AUTO: 0.05 K/UL (ref 0–0.2)
BASOPHILS NFR BLD: 0.7 % (ref 0–1.9)
BILIRUB SERPL-MCNC: 0.8 MG/DL (ref 0.1–1)
BUN SERPL-MCNC: 9 MG/DL (ref 8–23)
CALCIUM SERPL-MCNC: 9 MG/DL (ref 8.7–10.5)
CHLORIDE SERPL-SCNC: 98 MMOL/L (ref 95–110)
CHOLEST SERPL-MCNC: 171 MG/DL (ref 120–199)
CHOLEST/HDLC SERPL: 2 {RATIO} (ref 2–5)
CO2 SERPL-SCNC: 25 MMOL/L (ref 23–29)
CREAT SERPL-MCNC: 0.6 MG/DL (ref 0.5–1.4)
DIFFERENTIAL METHOD: ABNORMAL
EOSINOPHIL # BLD AUTO: 0.1 K/UL (ref 0–0.5)
EOSINOPHIL NFR BLD: 1.3 % (ref 0–8)
ERYTHROCYTE [DISTWIDTH] IN BLOOD BY AUTOMATED COUNT: 15 % (ref 11.5–14.5)
EST. GFR  (AFRICAN AMERICAN): >60 ML/MIN/1.73 M^2
EST. GFR  (NON AFRICAN AMERICAN): >60 ML/MIN/1.73 M^2
GLUCOSE SERPL-MCNC: 152 MG/DL (ref 70–110)
HCT VFR BLD AUTO: 46.2 % (ref 37–48.5)
HDLC SERPL-MCNC: 84 MG/DL (ref 40–75)
HDLC SERPL: 49.1 % (ref 20–50)
HGB BLD-MCNC: 15.4 G/DL (ref 12–16)
IMM GRANULOCYTES # BLD AUTO: 0.02 K/UL (ref 0–0.04)
IMM GRANULOCYTES NFR BLD AUTO: 0.3 % (ref 0–0.5)
LDLC SERPL CALC-MCNC: 79.8 MG/DL (ref 63–159)
LYMPHOCYTES # BLD AUTO: 2.3 K/UL (ref 1–4.8)
LYMPHOCYTES NFR BLD: 30.6 % (ref 18–48)
MCH RBC QN AUTO: 30.1 PG (ref 27–31)
MCHC RBC AUTO-ENTMCNC: 33.3 G/DL (ref 32–36)
MCV RBC AUTO: 90 FL (ref 82–98)
MONOCYTES # BLD AUTO: 0.6 K/UL (ref 0.3–1)
MONOCYTES NFR BLD: 8.2 % (ref 4–15)
NEUTROPHILS # BLD AUTO: 4.4 K/UL (ref 1.8–7.7)
NEUTROPHILS NFR BLD: 58.9 % (ref 38–73)
NONHDLC SERPL-MCNC: 87 MG/DL
NRBC BLD-RTO: 0 /100 WBC
PLATELET # BLD AUTO: 194 K/UL (ref 150–350)
PMV BLD AUTO: 9.5 FL (ref 9.2–12.9)
POTASSIUM SERPL-SCNC: 4 MMOL/L (ref 3.5–5.1)
PROT SERPL-MCNC: 7 G/DL (ref 6–8.4)
RBC # BLD AUTO: 5.12 M/UL (ref 4–5.4)
SODIUM SERPL-SCNC: 134 MMOL/L (ref 136–145)
TRIGL SERPL-MCNC: 36 MG/DL (ref 30–150)
WBC # BLD AUTO: 7.41 K/UL (ref 3.9–12.7)

## 2020-03-31 PROCEDURE — 80061 LIPID PANEL: CPT

## 2020-03-31 PROCEDURE — 82652 VIT D 1 25-DIHYDROXY: CPT

## 2020-03-31 PROCEDURE — 86300 IMMUNOASSAY TUMOR CA 15-3: CPT

## 2020-03-31 PROCEDURE — 36415 COLL VENOUS BLD VENIPUNCTURE: CPT

## 2020-03-31 PROCEDURE — 85025 COMPLETE CBC W/AUTO DIFF WBC: CPT

## 2020-03-31 PROCEDURE — 80053 COMPREHEN METABOLIC PANEL: CPT

## 2020-04-02 ENCOUNTER — OFFICE VISIT (OUTPATIENT)
Dept: HEMATOLOGY/ONCOLOGY | Facility: CLINIC | Age: 70
End: 2020-04-02
Payer: MEDICARE

## 2020-04-02 DIAGNOSIS — R92.30 DENSE BREASTS: ICD-10-CM

## 2020-04-02 DIAGNOSIS — C50.919 MALIGNANT NEOPLASM OF FEMALE BREAST, UNSPECIFIED ESTROGEN RECEPTOR STATUS, UNSPECIFIED LATERALITY, UNSPECIFIED SITE OF BREAST: Primary | ICD-10-CM

## 2020-04-02 DIAGNOSIS — Z17.0 ESTROGEN RECEPTOR POSITIVE: ICD-10-CM

## 2020-04-02 DIAGNOSIS — Z51.81 VISIT FOR MONITORING ARIMIDEX THERAPY: ICD-10-CM

## 2020-04-02 DIAGNOSIS — Z51.81 ENCOUNTER FOR MONITORING BISPHOSPHONATE THERAPY: ICD-10-CM

## 2020-04-02 DIAGNOSIS — M85.80 OSTEOPENIA, UNSPECIFIED LOCATION: ICD-10-CM

## 2020-04-02 DIAGNOSIS — C50.919 MALIGNANT NEOPLASM OF FEMALE BREAST, UNSPECIFIED ESTROGEN RECEPTOR STATUS, UNSPECIFIED LATERALITY, UNSPECIFIED SITE OF BREAST: ICD-10-CM

## 2020-04-02 DIAGNOSIS — Z79.811 VISIT FOR MONITORING ARIMIDEX THERAPY: ICD-10-CM

## 2020-04-02 DIAGNOSIS — M85.80 OSTEOPENIA, UNSPECIFIED LOCATION: Primary | ICD-10-CM

## 2020-04-02 DIAGNOSIS — Z79.83 ENCOUNTER FOR MONITORING BISPHOSPHONATE THERAPY: ICD-10-CM

## 2020-04-02 DIAGNOSIS — C50.412 MALIGNANT NEOPLASM OF UPPER-OUTER QUADRANT OF LEFT FEMALE BREAST, UNSPECIFIED ESTROGEN RECEPTOR STATUS: ICD-10-CM

## 2020-04-02 LAB — CANCER AG15-3 SERPL-ACNC: 19 U/ML (ref 0–31)

## 2020-04-02 PROCEDURE — 99441 PR PHYSICIAN TELEPHONE EVALUATION 5-10 MIN: CPT | Mod: 95,,, | Performed by: INTERNAL MEDICINE

## 2020-04-02 PROCEDURE — 99441 PR PHYSICIAN TELEPHONE EVALUATION 5-10 MIN: ICD-10-PCS | Mod: 95,,, | Performed by: INTERNAL MEDICINE

## 2020-04-02 RX ORDER — ANASTROZOLE 1 MG/1
1 TABLET ORAL DAILY
Qty: 90 TABLET | Refills: 1 | Status: SHIPPED | OUTPATIENT
Start: 2020-04-02 | End: 2020-10-07 | Stop reason: SDUPTHER

## 2020-04-02 NOTE — Clinical Note
Please get her Prolia for Monday in the [facility] and have her return to clinic in 6 months with CBC CMP

## 2020-04-02 NOTE — PROGRESS NOTES
The patient location is:  At home  The chief complaint leading to consultation is:  How are my blood counts  Visit type: Virtual visit with  audio   Total time spent with patient: over 40 min   Greater than 50% was spent on counseling and/or coordination of care.  Each patient to whom he or she provides medical services by telemedicine is:  (1) informed of the relationship between the physician and patient and the respective role of any other health care provider with respect to management of the patient; and (2) notified that he or she may decline to receive medical services by telemedicine and may withdraw from such care at any time.    Subjective:       Patient ID: Arlene López is a 69 y.o. female.    Chief Complaint  I did not get my shot   Follow-up        Pt underwent a left breast lumpectomy for stage I T1b N0 M0 invasive lobular carcinoma.  Receptors are strongly positive for estrogen and negative for HER-2/hardeep.  She completed postlumpectomy radiation  She started arimidex in March of 2017 as adjuvant endocrine therapy. She will continue this until 2022  She has been olerating prolia to vazquez off early osteoporosis.  Pt has dense breasts and was sent for MRI which was unrevealing. SHe is tolerating prolia ( last one in August )  to prevent early onset osteoporosis from the aromatase inhibtor.  last dexa APril 2016. SHe had a bone scan AUgust 2018  to evaluate the bony prominence involving her sternum. This was negative except for a patchy finding in the left tibia. SHe has no pain , no unsteady gait no history of trauma to the left leg. She used to have pain in her left leg until she changed shows     Mammogram  At [facility] performed revealed heterogeneously dense tissue, slightly improved appearance of the postop seroma    Bone mineral density scan showed osteopenia for her hips and normal mineralization of her spine  MRI of breasts January 2018 reveal no evidence of recurrence January 2018, post op seroma  stable   Here to review labs and for arimidex monitoring   Pt tolerated last prolia without complications  Feeling stable on lexapro for depression   Her cholesterol has been slightly above 200 and she is on a statin for such     Pt has undergone an ultrasound of her breast and the mass palpable in the LEFT UOQ id benign  Dexa reviewed : osteopenia   Due for prolia April 1, 2020 April 2, 2020 patient's labs are being reviewed today mammogram looked good no abnormality in her labs are good as well.  She is not having any fever chills night sweats and she remains using tobacco products    Past Medical History:   Diagnosis Date    Breast cancer 2016    left breast CA    Cancer     breast    Full dentures     High cholesterol     HTN (hypertension)     Wears glasses          Current Outpatient Medications:     anastrozole (ARIMIDEX) 1 mg Tab, Take 1 tablet (1 mg total) by mouth once daily., Disp: 90 tablet, Rfl: 1    aspirin (ECOTRIN) 81 MG EC tablet, Take 1 tablet (81 mg total) by mouth once daily., Disp: 90 tablet, Rfl: 3    atorvastatin (LIPITOR) 40 MG tablet, Take 1 tablet (40 mg total) by mouth once daily., Disp: 90 tablet, Rfl: 3    EPIPEN 2-BLANCA 0.3 mg/0.3 mL AtIn, , Disp: , Rfl:     escitalopram oxalate (LEXAPRO) 10 MG tablet, Take 1 tablet (10 mg total) by mouth once daily., Disp: 90 tablet, Rfl: 3    HYDROcodone-acetaminophen (NORCO) 5-325 mg per tablet, , Disp: , Rfl:     lisinopril (PRINIVIL,ZESTRIL) 20 MG tablet, Take 1 tablet (20 mg total) by mouth once daily., Disp: 90 tablet, Rfl: 3    prednisoLONE acetate (PRED FORTE) 1 % DrpS, , Disp: , Rfl:   She is currently taking Lexapro for depression and Zestril for hypertension both of which are monitored by her primary care physician  All medications and past history have been reviewed.  Review of Systems    CONSTITUTIONAL: No fevers, chills, night sweats, wt. loss, appetite changes  SKIN: no rashes or itching  ENT: No headaches, head trauma, no  rhinorrhea  LYMPH NODES: None noticed   CV: No chest pain, palpitations.   RESP: No dyspnea on exertion,no cough no  wheezing  GI: No nausea, emesis, diarrhea, constipation, melena,  pain.   : No dysuria, hematuria, or frequency   HEME: No easy bruising, bleeding problems  PSYCHIATRIC: No depression, anxiety, psychosis, hallucinations.  NEURO: No seizures, memory loss, dizziness or difficulty sleeping  MSK: No arthralgias or joint swelling  Objective:             Lab Results   Component Value Date    WBC 7.41 03/31/2020    HGB 15.4 03/31/2020    HCT 46.2 03/31/2020    MCV 90 03/31/2020     03/31/2020     CMP  Sodium   Date Value Ref Range Status   03/31/2020 134 (L) 136 - 145 mmol/L Final     Potassium   Date Value Ref Range Status   03/31/2020 4.0 3.5 - 5.1 mmol/L Final     Chloride   Date Value Ref Range Status   03/31/2020 98 95 - 110 mmol/L Final     CO2   Date Value Ref Range Status   03/31/2020 25 23 - 29 mmol/L Final     Glucose   Date Value Ref Range Status   03/31/2020 152 (H) 70 - 110 mg/dL Final     BUN, Bld   Date Value Ref Range Status   03/31/2020 9 8 - 23 mg/dL Final     Creatinine   Date Value Ref Range Status   03/31/2020 0.6 0.5 - 1.4 mg/dL Final     Calcium   Date Value Ref Range Status   03/31/2020 9.0 8.7 - 10.5 mg/dL Final     Total Protein   Date Value Ref Range Status   03/31/2020 7.0 6.0 - 8.4 g/dL Final     Albumin   Date Value Ref Range Status   03/31/2020 3.8 3.5 - 5.2 g/dL Final     Total Bilirubin   Date Value Ref Range Status   03/31/2020 0.8 0.1 - 1.0 mg/dL Final     Comment:     For infants and newborns, interpretation of results should be based  on gestational age, weight and in agreement with clinical  observations.  Premature Infant recommended reference ranges:  Up to 24 hours.............<8.0 mg/dL  Up to 48 hours............<12.0 mg/dL  3-5 days..................<15.0 mg/dL  6-29 days.................<15.0 mg/dL       Alkaline Phosphatase   Date Value Ref Range Status    03/31/2020 72 55 - 135 U/L Final     AST   Date Value Ref Range Status   03/31/2020 26 10 - 40 U/L Final     ALT   Date Value Ref Range Status   03/31/2020 13 10 - 44 U/L Final     Anion Gap   Date Value Ref Range Status   03/31/2020 11 8 - 16 mmol/L Final     eGFR if    Date Value Ref Range Status   03/31/2020 >60.0 >60 mL/min/1.73 m^2 Final     eGFR if non    Date Value Ref Range Status   03/31/2020 >60.0 >60 mL/min/1.73 m^2 Final     Comment:     Calculation used to obtain the estimated glomerular filtration  rate (eGFR) is the CKD-EPI equation.          Reviewed bone scan and mammogram and labs   Assessment:       1. Osteopenia, unspecified location    2. Malignant neoplasm of female breast, unspecified estrogen receptor status, unspecified laterality, unspecified site of breast    3. Malignant neoplasm of upper-outer quadrant of left female breast, unspecified estrogen receptor status    4. Estrogen receptor positive    5. Visit for monitoring Arimidex therapy    6. Dense breasts    7. Encounter for monitoring bisphosphonate therapy        Plan:         Osteopenia, unspecified location    Malignant neoplasm of female breast, unspecified estrogen receptor status, unspecified laterality, unspecified site of breast  -     anastrozole (ARIMIDEX) 1 mg Tab; Take 1 tablet (1 mg total) by mouth once daily.  Dispense: 90 tablet; Refill: 1    Malignant neoplasm of upper-outer quadrant of left female breast, unspecified estrogen receptor status    Estrogen receptor positive    Visit for monitoring Arimidex therapy    Dense breasts    Encounter for monitoring bisphosphonate therapy           Cont lexapro which is keeping her anxiety/ depression stable  Next Prolia  Due now per insurance to prevent osteoporosis from AI   Studies that compare Prolia to bisphosphonates show that there are greater increases in bone mineral density (BMD) with Prolia  Cleared for prolia     Dexa with  osteopenia  Cont prolia while on AI   Cont calcium and Vitamin D    Continue lisinopril for hypertension which is monitored by primary care physician    Current Outpatient Medications:     anastrozole (ARIMIDEX) 1 mg Tab, Take 1 tablet (1 mg total) by mouth once daily., Disp: 90 tablet, Rfl: 1    aspirin (ECOTRIN) 81 MG EC tablet, Take 1 tablet (81 mg total) by mouth once daily., Disp: 90 tablet, Rfl: 3    atorvastatin (LIPITOR) 40 MG tablet, Take 1 tablet (40 mg total) by mouth once daily., Disp: 90 tablet, Rfl: 3    EPIPEN 2-BLANCA 0.3 mg/0.3 mL AtIn, , Disp: , Rfl:     escitalopram oxalate (LEXAPRO) 10 MG tablet, Take 1 tablet (10 mg total) by mouth once daily., Disp: 90 tablet, Rfl: 3    HYDROcodone-acetaminophen (NORCO) 5-325 mg per tablet, , Disp: , Rfl:     lisinopril (PRINIVIL,ZESTRIL) 20 MG tablet, Take 1 tablet (20 mg total) by mouth once daily., Disp: 90 tablet, Rfl: 3    prednisoLONE acetate (PRED FORTE) 1 % DrpS, , Disp: , Rfl:       Thank you for allowing me to participate in this pleasant patient's care. Please call with any questions or concerns.

## 2020-04-03 LAB — 1,25(OH)2D3 SERPL-MCNC: 38 PG/ML (ref 20–79)

## 2020-04-06 ENCOUNTER — INFUSION (OUTPATIENT)
Dept: INFUSION THERAPY | Facility: HOSPITAL | Age: 70
End: 2020-04-06
Payer: MEDICARE

## 2020-04-06 VITALS
RESPIRATION RATE: 18 BRPM | DIASTOLIC BLOOD PRESSURE: 73 MMHG | TEMPERATURE: 98 F | SYSTOLIC BLOOD PRESSURE: 162 MMHG | OXYGEN SATURATION: 100 % | HEART RATE: 77 BPM

## 2020-04-06 DIAGNOSIS — Z79.83 ENCOUNTER FOR MONITORING BISPHOSPHONATE THERAPY: Primary | ICD-10-CM

## 2020-04-06 DIAGNOSIS — Z51.81 ENCOUNTER FOR MONITORING BISPHOSPHONATE THERAPY: Primary | ICD-10-CM

## 2020-04-06 DIAGNOSIS — C50.912 MALIGNANT NEOPLASM OF LEFT FEMALE BREAST, UNSPECIFIED ESTROGEN RECEPTOR STATUS, UNSPECIFIED SITE OF BREAST: ICD-10-CM

## 2020-04-06 DIAGNOSIS — Z51.81 VISIT FOR MONITORING ARIMIDEX THERAPY: ICD-10-CM

## 2020-04-06 DIAGNOSIS — Z79.811 VISIT FOR MONITORING ARIMIDEX THERAPY: ICD-10-CM

## 2020-04-06 DIAGNOSIS — M85.80 OSTEOPENIA, UNSPECIFIED LOCATION: ICD-10-CM

## 2020-04-06 PROCEDURE — 63600175 PHARM REV CODE 636 W HCPCS: Mod: JG | Performed by: INTERNAL MEDICINE

## 2020-04-06 PROCEDURE — 96372 THER/PROPH/DIAG INJ SC/IM: CPT

## 2020-04-06 RX ADMIN — DENOSUMAB 60 MG: 60 INJECTION SUBCUTANEOUS at 08:04

## 2020-04-06 NOTE — PLAN OF CARE
Pt tolerated prolia sq injection to back of left arm. Pt denies injection pain, redness, swelling, numbness, or tingling. Pt in nad. Pt refuses avs and will wait for pcp to tell her to return in 6 months. Pt has no further questions or concerns. Pt ambulatory for discharge at this time.

## 2020-06-19 ENCOUNTER — HOSPITAL ENCOUNTER (OUTPATIENT)
Dept: RADIOLOGY | Facility: HOSPITAL | Age: 70
Discharge: HOME | End: 2020-06-19
Attending: NURSE PRACTITIONER
Payer: MEDICARE

## 2020-06-19 DIAGNOSIS — F17.200 CURRENT SMOKER: ICD-10-CM

## 2020-06-19 DIAGNOSIS — R20.2 PARESTHESIA AND PAIN OF EXTREMITY: ICD-10-CM

## 2020-06-19 DIAGNOSIS — M79.609 PARESTHESIA AND PAIN OF EXTREMITY: ICD-10-CM

## 2020-06-19 DIAGNOSIS — I10 HYPERTENSION, ESSENTIAL: ICD-10-CM

## 2020-06-19 DIAGNOSIS — Z98.890 HISTORY OF CEA (CAROTID ENDARTERECTOMY): ICD-10-CM

## 2020-06-19 DIAGNOSIS — D75.1 POLYCYTHEMIA, SECONDARY: ICD-10-CM

## 2020-06-19 PROCEDURE — 72040 X-RAY EXAM NECK SPINE 2-3 VW: CPT | Mod: 26,,, | Performed by: RADIOLOGY

## 2020-06-19 PROCEDURE — 93930 US UPPER EXTREMITY ARTERIES BILATERAL: ICD-10-PCS | Mod: 26,,, | Performed by: RADIOLOGY

## 2020-06-19 PROCEDURE — 93930 UPPER EXTREMITY STUDY: CPT | Mod: TC

## 2020-06-19 PROCEDURE — 72040 X-RAY EXAM NECK SPINE 2-3 VW: CPT | Mod: TC,FY

## 2020-06-19 PROCEDURE — 93930 UPPER EXTREMITY STUDY: CPT | Mod: 26,,, | Performed by: RADIOLOGY

## 2020-06-19 PROCEDURE — 72040 XR CERVICAL SPINE 2 OR 3 VIEWS: ICD-10-PCS | Mod: 26,,, | Performed by: RADIOLOGY

## 2020-08-06 ENCOUNTER — LAB VISIT (OUTPATIENT)
Dept: LAB | Facility: HOSPITAL | Age: 70
End: 2020-08-06
Attending: INTERNAL MEDICINE
Payer: MEDICARE

## 2020-08-06 DIAGNOSIS — E78.2 MIXED HYPERLIPIDEMIA: Primary | ICD-10-CM

## 2020-08-06 LAB
ALBUMIN SERPL BCP-MCNC: 3.9 G/DL (ref 3.5–5.2)
ALP SERPL-CCNC: 59 U/L (ref 55–135)
ALT SERPL W/O P-5'-P-CCNC: 17 U/L (ref 10–44)
ANION GAP SERPL CALC-SCNC: 10 MMOL/L (ref 8–16)
AST SERPL-CCNC: 23 U/L (ref 10–40)
BILIRUB SERPL-MCNC: 0.5 MG/DL (ref 0.1–1)
BUN SERPL-MCNC: 7 MG/DL (ref 8–23)
CALCIUM SERPL-MCNC: 9.3 MG/DL (ref 8.7–10.5)
CHLORIDE SERPL-SCNC: 100 MMOL/L (ref 95–110)
CHOLEST SERPL-MCNC: 198 MG/DL (ref 120–199)
CHOLEST/HDLC SERPL: 2.2 {RATIO} (ref 2–5)
CO2 SERPL-SCNC: 28 MMOL/L (ref 23–29)
CREAT SERPL-MCNC: 0.6 MG/DL (ref 0.5–1.4)
EST. GFR  (AFRICAN AMERICAN): >60 ML/MIN/1.73 M^2
EST. GFR  (NON AFRICAN AMERICAN): >60 ML/MIN/1.73 M^2
GLUCOSE SERPL-MCNC: 100 MG/DL (ref 70–110)
HDLC SERPL-MCNC: 89 MG/DL (ref 40–75)
HDLC SERPL: 44.9 % (ref 20–50)
LDLC SERPL CALC-MCNC: 99.6 MG/DL (ref 63–159)
NONHDLC SERPL-MCNC: 109 MG/DL
POTASSIUM SERPL-SCNC: 4.6 MMOL/L (ref 3.5–5.1)
PROT SERPL-MCNC: 7.6 G/DL (ref 6–8.4)
SODIUM SERPL-SCNC: 138 MMOL/L (ref 136–145)
TRIGL SERPL-MCNC: 47 MG/DL (ref 30–150)

## 2020-08-06 PROCEDURE — 80053 COMPREHEN METABOLIC PANEL: CPT

## 2020-08-06 PROCEDURE — 36415 COLL VENOUS BLD VENIPUNCTURE: CPT

## 2020-08-06 PROCEDURE — 80061 LIPID PANEL: CPT

## 2020-10-06 ENCOUNTER — LAB VISIT (OUTPATIENT)
Dept: LAB | Facility: HOSPITAL | Age: 70
End: 2020-10-06
Attending: INTERNAL MEDICINE
Payer: MEDICARE

## 2020-10-06 DIAGNOSIS — C50.919 MALIGNANT NEOPLASM OF FEMALE BREAST, UNSPECIFIED ESTROGEN RECEPTOR STATUS, UNSPECIFIED LATERALITY, UNSPECIFIED SITE OF BREAST: ICD-10-CM

## 2020-10-06 DIAGNOSIS — M85.80 OSTEOPENIA, UNSPECIFIED LOCATION: ICD-10-CM

## 2020-10-06 LAB
ALBUMIN SERPL BCP-MCNC: 3.9 G/DL (ref 3.5–5.2)
ALP SERPL-CCNC: 56 U/L (ref 55–135)
ALT SERPL W/O P-5'-P-CCNC: 17 U/L (ref 10–44)
ANION GAP SERPL CALC-SCNC: 11 MMOL/L (ref 8–16)
AST SERPL-CCNC: 26 U/L (ref 10–40)
BASOPHILS # BLD AUTO: 0.06 K/UL (ref 0–0.2)
BASOPHILS NFR BLD: 0.8 % (ref 0–1.9)
BILIRUB SERPL-MCNC: 0.8 MG/DL (ref 0.1–1)
BUN SERPL-MCNC: 7 MG/DL (ref 8–23)
CALCIUM SERPL-MCNC: 9.1 MG/DL (ref 8.7–10.5)
CHLORIDE SERPL-SCNC: 99 MMOL/L (ref 95–110)
CO2 SERPL-SCNC: 25 MMOL/L (ref 23–29)
CREAT SERPL-MCNC: 0.6 MG/DL (ref 0.5–1.4)
DIFFERENTIAL METHOD: ABNORMAL
EOSINOPHIL # BLD AUTO: 0.1 K/UL (ref 0–0.5)
EOSINOPHIL NFR BLD: 1.6 % (ref 0–8)
ERYTHROCYTE [DISTWIDTH] IN BLOOD BY AUTOMATED COUNT: 13.5 % (ref 11.5–14.5)
EST. GFR  (AFRICAN AMERICAN): >60 ML/MIN/1.73 M^2
EST. GFR  (NON AFRICAN AMERICAN): >60 ML/MIN/1.73 M^2
GLUCOSE SERPL-MCNC: 156 MG/DL (ref 70–110)
HCT VFR BLD AUTO: 46.7 % (ref 37–48.5)
HGB BLD-MCNC: 15.8 G/DL (ref 12–16)
IMM GRANULOCYTES # BLD AUTO: 0.01 K/UL (ref 0–0.04)
IMM GRANULOCYTES NFR BLD AUTO: 0.1 % (ref 0–0.5)
LYMPHOCYTES # BLD AUTO: 2.4 K/UL (ref 1–4.8)
LYMPHOCYTES NFR BLD: 32.8 % (ref 18–48)
MCH RBC QN AUTO: 32.2 PG (ref 27–31)
MCHC RBC AUTO-ENTMCNC: 33.8 G/DL (ref 32–36)
MCV RBC AUTO: 95 FL (ref 82–98)
MONOCYTES # BLD AUTO: 0.6 K/UL (ref 0.3–1)
MONOCYTES NFR BLD: 8 % (ref 4–15)
NEUTROPHILS # BLD AUTO: 4.2 K/UL (ref 1.8–7.7)
NEUTROPHILS NFR BLD: 56.7 % (ref 38–73)
NRBC BLD-RTO: 0 /100 WBC
PLATELET # BLD AUTO: 186 K/UL (ref 150–350)
PMV BLD AUTO: 10 FL (ref 9.2–12.9)
POTASSIUM SERPL-SCNC: 3.7 MMOL/L (ref 3.5–5.1)
PROT SERPL-MCNC: 7 G/DL (ref 6–8.4)
RBC # BLD AUTO: 4.91 M/UL (ref 4–5.4)
SODIUM SERPL-SCNC: 135 MMOL/L (ref 136–145)
WBC # BLD AUTO: 7.41 K/UL (ref 3.9–12.7)

## 2020-10-06 PROCEDURE — 80053 COMPREHEN METABOLIC PANEL: CPT

## 2020-10-06 PROCEDURE — 85025 COMPLETE CBC W/AUTO DIFF WBC: CPT

## 2020-10-06 PROCEDURE — 36415 COLL VENOUS BLD VENIPUNCTURE: CPT

## 2020-10-07 ENCOUNTER — OFFICE VISIT (OUTPATIENT)
Dept: HEMATOLOGY/ONCOLOGY | Facility: CLINIC | Age: 70
End: 2020-10-07
Payer: MEDICARE

## 2020-10-07 ENCOUNTER — TELEPHONE (OUTPATIENT)
Dept: INFUSION THERAPY | Facility: HOSPITAL | Age: 70
End: 2020-10-07

## 2020-10-07 VITALS
DIASTOLIC BLOOD PRESSURE: 83 MMHG | TEMPERATURE: 98 F | BODY MASS INDEX: 20.96 KG/M2 | RESPIRATION RATE: 18 BRPM | SYSTOLIC BLOOD PRESSURE: 168 MMHG | WEIGHT: 111 LBS | HEIGHT: 61 IN | HEART RATE: 83 BPM | OXYGEN SATURATION: 98 %

## 2020-10-07 DIAGNOSIS — R93.9 DIAGNOSTIC IMAGING INCONCLUSIVE DUE TO EXCESS BODY FAT OF PATIENT: ICD-10-CM

## 2020-10-07 DIAGNOSIS — D75.1 POLYCYTHEMIA, SECONDARY: ICD-10-CM

## 2020-10-07 DIAGNOSIS — Z51.81 VISIT FOR MONITORING ARIMIDEX THERAPY: ICD-10-CM

## 2020-10-07 DIAGNOSIS — Z79.811 VISIT FOR MONITORING ARIMIDEX THERAPY: ICD-10-CM

## 2020-10-07 DIAGNOSIS — M85.80 OSTEOPENIA, UNSPECIFIED LOCATION: Primary | ICD-10-CM

## 2020-10-07 DIAGNOSIS — C50.919 MALIGNANT NEOPLASM OF FEMALE BREAST, UNSPECIFIED ESTROGEN RECEPTOR STATUS, UNSPECIFIED LATERALITY, UNSPECIFIED SITE OF BREAST: ICD-10-CM

## 2020-10-07 DIAGNOSIS — Z17.0 ESTROGEN RECEPTOR POSITIVE: ICD-10-CM

## 2020-10-07 DIAGNOSIS — R92.30 DENSE BREASTS: ICD-10-CM

## 2020-10-07 DIAGNOSIS — R03.0 ELEVATED BLOOD PRESSURE READING: ICD-10-CM

## 2020-10-07 PROCEDURE — 94760 N-INVAS EAR/PLS OXIMETRY 1: CPT | Mod: PBBFAC | Performed by: INTERNAL MEDICINE

## 2020-10-07 PROCEDURE — 99999 PR PBB SHADOW E&M-EST. PATIENT-LVL IV: CPT | Mod: PBBFAC,,, | Performed by: INTERNAL MEDICINE

## 2020-10-07 PROCEDURE — 99999 PR PBB SHADOW E&M-EST. PATIENT-LVL IV: ICD-10-PCS | Mod: PBBFAC,,, | Performed by: INTERNAL MEDICINE

## 2020-10-07 PROCEDURE — 99215 PR OFFICE/OUTPT VISIT, EST, LEVL V, 40-54 MIN: ICD-10-PCS | Mod: S$PBB,,, | Performed by: INTERNAL MEDICINE

## 2020-10-07 PROCEDURE — 99215 OFFICE O/P EST HI 40 MIN: CPT | Mod: S$PBB,,, | Performed by: INTERNAL MEDICINE

## 2020-10-07 PROCEDURE — 99214 OFFICE O/P EST MOD 30 MIN: CPT | Mod: PBBFAC | Performed by: INTERNAL MEDICINE

## 2020-10-07 RX ORDER — ANASTROZOLE 1 MG/1
1 TABLET ORAL DAILY
Qty: 90 TABLET | Refills: 1 | Status: SHIPPED | OUTPATIENT
Start: 2020-10-07 | End: 2021-01-15 | Stop reason: SDUPTHER

## 2020-10-07 NOTE — PROGRESS NOTES
Patient ID: Arlene López is a 70 y.o. female.    Chief Complaint  I need  my shot   Follow-up        Pt underwent a left breast lumpectomy for stage I T1b N0 M0 invasive lobular carcinoma.  Receptors are strongly positive for estrogen and negative for HER-2/hardeep.  She completed postlumpectomy radiation  She started arimidex in March of 2017 as adjuvant endocrine therapy. She will continue this until 2022  She has been olerating prolia to vazquez off early osteoporosis.  Pt has dense breasts and was sent for MRI which was unrevealing. SHe is tolerating prolia ( last one in August )  to prevent early onset osteoporosis from the aromatase inhibtor.  last dexa APril 2016. SHe had a bone scan AUgust 2018  to evaluate the bony prominence involving her sternum. This was negative except for a patchy finding in the left tibia. SHe has no pain , no unsteady gait no history of trauma to the left leg.      Mammogram  At [facility] performed revealed heterogeneously dense tissue, slightly improved appearance of the postop seroma    Bone mineral density scan showed osteopenia for her hips and normal mineralization of her spine  MRI of breasts January 2018 reveal no evidence of recurrence January 2018, post op seroma stable   Here to review labs and for arimidex monitoring and bisphosphonate monitoring  Pt tolerated last prolia without complications  Feeling stable on lexapro for depression   Her cholesterol has been slightly above 200 and she is on a statin for such     Pt has undergone an ultrasound of her breast and the mass palpable in the LEFT UOQ id benign  Dexa reviewed : osteopenia   Due for prolia  now      Past Medical History:   Diagnosis Date    Breast cancer 2016    left breast CA    Cancer     breast    Full dentures     High cholesterol     HTN (hypertension)     Wears glasses          Current Outpatient Medications:     anastrozole (ARIMIDEX) 1 mg Tab, Take 1 tablet (1 mg total) by mouth once daily., Disp: 90  "tablet, Rfl: 1    aspirin (ECOTRIN) 81 MG EC tablet, Take 1 tablet (81 mg total) by mouth once daily., Disp: 90 tablet, Rfl: 3    atorvastatin (LIPITOR) 40 MG tablet, Take 1 tablet (40 mg total) by mouth once daily., Disp: 90 tablet, Rfl: 3    EPIPEN 2-BLANCA 0.3 mg/0.3 mL AtIn, , Disp: , Rfl:     escitalopram oxalate (LEXAPRO) 10 MG tablet, Take 1 tablet (10 mg total) by mouth once daily., Disp: 90 tablet, Rfl: 3    lisinopril (PRINIVIL,ZESTRIL) 20 MG tablet, Take 1 tablet (20 mg total) by mouth once daily., Disp: 90 tablet, Rfl: 3    prednisoLONE acetate (PRED FORTE) 1 % DrpS, , Disp: , Rfl:   She is currently taking Lexapro for depression and Zestril for hypertension both of which are monitored by her primary care physician  All medications and past history have been reviewed.  Review of Systems    CONSTITUTIONAL: No fevers, chills, night sweats, wt. loss, appetite changes  SKIN: no rashes or itching  ENT: No headaches, head trauma, no rhinorrhea  LYMPH NODES: None noticed   CV: No chest pain, palpitations.   RESP: No dyspnea on exertion,no cough no  wheezing  GI: No nausea, emesis, diarrhea, constipation, melena,  pain.   : No dysuria, hematuria, or frequency   HEME: No easy bruising, bleeding problems  PSYCHIATRIC: No depression, anxiety, psychosis, hallucinations.  NEURO: No seizures, memory loss, dizziness or difficulty sleeping  MSK: No arthralgias or joint swelling  Objective:         BP (!) 168/83   Pulse 83   Temp 97.5 °F (36.4 °C) (Temporal)   Resp 18   Ht 5' 1" (1.549 m)   Wt 50.3 kg (111 lb)   SpO2 98%   BMI 20.97 kg/m²   Height / weight / VS reviewed  GENERAL.: Well-developed, well-nourished, in no acute distress  PSYCH: pleasant affect, no anxiety, no depression  HEENT: Normocephalic, lids intact, conjunctiva pink, sinuses nontender to palpation  non-boggy turbinates,Normal auricula, nasal septum, dentition, gums and mucosa ,OP clear,no palatal pallor  NECK: Supple,trachea midline, no " palpable abnormalities  LYMPHATICS: No cervical or axillary adenopathy  RESPIRATORY: CTAB, no wheezes, rubs or rhonchi  Good respiratory effort without any retractions or diaphragmatic movement  CARDIOVASCULAR: no JVD, S1 / S2 with RRR, no murmur, no rub,2+ capillary refill  ABDOMEN: NTND, normal bowel sounds, no palpable HSM or mass  EXTREMITIES: No cyanosis, no clubbing, no edema, no joint effusion  NEUROLOGICAL: alert and oriented x 3, cranial nerves grossly intact  SKIN: Warm, dry, no rash or lesions noted, no tenting, no petechiae or ecchymosis      Lab Results   Component Value Date    WBC 7.41 10/06/2020    HGB 15.8 10/06/2020    HCT 46.7 10/06/2020    MCV 95 10/06/2020     10/06/2020     CMP  Sodium   Date Value Ref Range Status   10/06/2020 135 (L) 136 - 145 mmol/L Final     Potassium   Date Value Ref Range Status   10/06/2020 3.7 3.5 - 5.1 mmol/L Final     Chloride   Date Value Ref Range Status   10/06/2020 99 95 - 110 mmol/L Final     CO2   Date Value Ref Range Status   10/06/2020 25 23 - 29 mmol/L Final     Glucose   Date Value Ref Range Status   10/06/2020 156 (H) 70 - 110 mg/dL Final     BUN, Bld   Date Value Ref Range Status   10/06/2020 7 (L) 8 - 23 mg/dL Final     Creatinine   Date Value Ref Range Status   10/06/2020 0.6 0.5 - 1.4 mg/dL Final     Calcium   Date Value Ref Range Status   10/06/2020 9.1 8.7 - 10.5 mg/dL Final     Total Protein   Date Value Ref Range Status   10/06/2020 7.0 6.0 - 8.4 g/dL Final     Albumin   Date Value Ref Range Status   10/06/2020 3.9 3.5 - 5.2 g/dL Final     Total Bilirubin   Date Value Ref Range Status   10/06/2020 0.8 0.1 - 1.0 mg/dL Final     Comment:     For infants and newborns, interpretation of results should be based  on gestational age, weight and in agreement with clinical  observations.  Premature Infant recommended reference ranges:  Up to 24 hours.............<8.0 mg/dL  Up to 48 hours............<12.0 mg/dL  3-5 days..................<15.0  mg/dL  6-29 days.................<15.0 mg/dL       Alkaline Phosphatase   Date Value Ref Range Status   10/06/2020 56 55 - 135 U/L Final     AST   Date Value Ref Range Status   10/06/2020 26 10 - 40 U/L Final     ALT   Date Value Ref Range Status   10/06/2020 17 10 - 44 U/L Final     Anion Gap   Date Value Ref Range Status   10/06/2020 11 8 - 16 mmol/L Final     eGFR if    Date Value Ref Range Status   10/06/2020 >60.0 >60 mL/min/1.73 m^2 Final     eGFR if non    Date Value Ref Range Status   10/06/2020 >60.0 >60 mL/min/1.73 m^2 Final     Comment:     Calculation used to obtain the estimated glomerular filtration  rate (eGFR) is the CKD-EPI equation.          Reviewed bone scan and mammogram and labs     Us Upper Extremity Arteries Bilateral    Result Date: 6/19/2020  EXAMINATION: US UPPER EXTREMITY ARTERIES BILATERAL CLINICAL HISTORY: Paresthesia. TECHNIQUE: Bilateral upper extremity arterial duplex ultrasound examination performed. Multiple gray scale and color doppler images were obtained in addition to waveform analysis. COMPARISON: None. FINDINGS: The peak systolic velocities on the right are as follows, in centimeters/second: Subclavian artery centrally: 206 Axillary artery: 200 Brachial artery- Proximal: 145 Mid: 207 Distal: 144 Radial artery: 92 Ulnar artery: 116 The peak systolic velocities on the left are as follows, in centimeters/second: Subclavian artery centrally: 120 Axillary artery: 114 Brachial artery- Proximal: 97 Mid: 157 Distal: 87 Radial artery: 35 Ulnar artery: 99     1. Multiple foci of elevated velocities within the proximal, mid and distal arteries of the right upper extremity consistent with foci of atherosclerotic stenosis. 2. Elevated velocities within the left brachial and left ulnar artery consistent with foci of atherosclerotic stenosis. Electronically signed by: Jerald Marroquin Date:    06/19/2020 Time:    12:37    X-ray Cervical Spine 2 Or 3  Views    Result Date: 6/19/2020  EXAMINATION: XR CERVICAL SPINE 2 OR 3 VIEWS CLINICAL HISTORY: Paresthesia of skin TECHNIQUE: AP, lateral and open mouth views of the cervical spine were performed. COMPARISON: None. FINDINGS: C7 is obscured on the lateral view due to superimposition of the shoulders.  The remainder of the vertebral bodies maintain normal height.  There is no fracture.  There is 2 mm anterolisthesis of C3 on C4.  There is moderate disc space narrowing at the C5-6 and C6-7 levels.  There is anterior osteophyte formation throughout the cervical spine.  There is multilevel facet joint arthropathy.  The facet joints however maintain normal articulation.  The odontoid process is intact. The prevertebral soft tissues are normal.  The airway is patent.  The lung apices are clear.  There are surgical clips in the left side of the neck.     1. There is multilevel degenerative disc and facet disease.  There is trace degenerative anterolisthesis of C3 on C4.  C7 is obscured but otherwise there is no acute fracture. Electronically signed by: Eloy Hartley MD Date:    06/19/2020 Time:    11:48    Assessment:       1. Osteopenia, unspecified location    2. Malignant neoplasm of female breast, unspecified estrogen receptor status, unspecified laterality, unspecified site of breast    3. Visit for monitoring Arimidex therapy    4. Estrogen receptor positive    5. Dense breasts    6. Elevated blood pressure reading    7. Polycythemia, secondary        Plan:         Osteopenia, unspecified location    Malignant neoplasm of female breast, unspecified estrogen receptor status, unspecified laterality, unspecified site of breast  -     anastrozole (ARIMIDEX) 1 mg Tab; Take 1 tablet (1 mg total) by mouth once daily.  Dispense: 90 tablet; Refill: 1    Visit for monitoring Arimidex therapy    Estrogen receptor positive    Dense breasts    Elevated blood pressure reading    Polycythemia, secondary         Cleared for  prolia   PROLIA (DENOSUMAB) Q6M    Cont lexapro which is keeping her anxiety/ depression stable  Next Prolia  Due now per insurance to prevent osteoporosis from AI   Studies that compare Prolia to bisphosphonates show that there are greater increases in bone mineral density (BMD) with Prolia  Cleared for prolia   Dexa with osteopenia  Cont prolia while on AI   Cont calcium and Vitamin D    RTC  february  with labs and mammo  Continue lisinopril for hypertension which is monitored by primary care physician    Current Outpatient Medications:     anastrozole (ARIMIDEX) 1 mg Tab, Take 1 tablet (1 mg total) by mouth once daily., Disp: 90 tablet, Rfl: 1    aspirin (ECOTRIN) 81 MG EC tablet, Take 1 tablet (81 mg total) by mouth once daily., Disp: 90 tablet, Rfl: 3    atorvastatin (LIPITOR) 40 MG tablet, Take 1 tablet (40 mg total) by mouth once daily., Disp: 90 tablet, Rfl: 3    EPIPEN 2-BLANCA 0.3 mg/0.3 mL AtIn, , Disp: , Rfl:     escitalopram oxalate (LEXAPRO) 10 MG tablet, Take 1 tablet (10 mg total) by mouth once daily., Disp: 90 tablet, Rfl: 3    lisinopril (PRINIVIL,ZESTRIL) 20 MG tablet, Take 1 tablet (20 mg total) by mouth once daily., Disp: 90 tablet, Rfl: 3    prednisoLONE acetate (PRED FORTE) 1 % DrpS, , Disp: , Rfl:       Thank you for allowing me to participate in this pleasant patient's care. Please call with any questions or concerns.

## 2020-10-07 NOTE — TELEPHONE ENCOUNTER
Patient missed appt for prolia today. Attempted to call patient to r/s. No answer. LVM to call [phone] to r/s prolia injection.

## 2020-10-08 ENCOUNTER — INFUSION (OUTPATIENT)
Dept: INFUSION THERAPY | Facility: HOSPITAL | Age: 70
End: 2020-10-08
Attending: INTERNAL MEDICINE
Payer: MEDICARE

## 2020-10-08 VITALS
RESPIRATION RATE: 18 BRPM | DIASTOLIC BLOOD PRESSURE: 70 MMHG | TEMPERATURE: 96 F | HEART RATE: 75 BPM | SYSTOLIC BLOOD PRESSURE: 159 MMHG | OXYGEN SATURATION: 98 %

## 2020-10-08 DIAGNOSIS — M85.80 OSTEOPENIA, UNSPECIFIED LOCATION: ICD-10-CM

## 2020-10-08 DIAGNOSIS — Z51.81 ENCOUNTER FOR MONITORING BISPHOSPHONATE THERAPY: Primary | ICD-10-CM

## 2020-10-08 DIAGNOSIS — Z51.81 VISIT FOR MONITORING ARIMIDEX THERAPY: ICD-10-CM

## 2020-10-08 DIAGNOSIS — Z79.811 VISIT FOR MONITORING ARIMIDEX THERAPY: ICD-10-CM

## 2020-10-08 DIAGNOSIS — C50.912 MALIGNANT NEOPLASM OF LEFT FEMALE BREAST, UNSPECIFIED ESTROGEN RECEPTOR STATUS, UNSPECIFIED SITE OF BREAST: ICD-10-CM

## 2020-10-08 DIAGNOSIS — Z79.83 ENCOUNTER FOR MONITORING BISPHOSPHONATE THERAPY: Primary | ICD-10-CM

## 2020-10-08 PROCEDURE — 63600175 PHARM REV CODE 636 W HCPCS: Mod: JG | Performed by: INTERNAL MEDICINE

## 2020-10-08 PROCEDURE — 96372 THER/PROPH/DIAG INJ SC/IM: CPT

## 2020-10-08 RX ADMIN — DENOSUMAB 60 MG: 60 INJECTION SUBCUTANEOUS at 08:10

## 2020-10-08 NOTE — PLAN OF CARE
Pt in clinic for prolia injection. Pt has been on injection for years requiring no further education. Back of right arm prepped and 60 mg prolia administered via sq route. Pt tolerated w/o difficulty. Pt ambulatory at this time for discharge.

## 2020-11-04 ENCOUNTER — PATIENT MESSAGE (OUTPATIENT)
Dept: HEMATOLOGY/ONCOLOGY | Facility: CLINIC | Age: 70
End: 2020-11-04

## 2021-01-15 DIAGNOSIS — C50.919 MALIGNANT NEOPLASM OF FEMALE BREAST, UNSPECIFIED ESTROGEN RECEPTOR STATUS, UNSPECIFIED LATERALITY, UNSPECIFIED SITE OF BREAST: ICD-10-CM

## 2021-01-16 RX ORDER — ANASTROZOLE 1 MG/1
1 TABLET ORAL DAILY
Qty: 90 TABLET | Refills: 1 | Status: SHIPPED | OUTPATIENT
Start: 2021-01-16 | End: 2021-04-27 | Stop reason: SDUPTHER

## 2021-02-02 ENCOUNTER — HOSPITAL ENCOUNTER (OUTPATIENT)
Dept: RADIOLOGY | Facility: HOSPITAL | Age: 71
Discharge: HOME | End: 2021-02-02
Attending: INTERNAL MEDICINE
Payer: MEDICARE

## 2021-02-02 VITALS — HEIGHT: 61 IN | WEIGHT: 110.88 LBS | BODY MASS INDEX: 20.93 KG/M2

## 2021-02-02 DIAGNOSIS — M85.80 OSTEOPENIA, UNSPECIFIED LOCATION: ICD-10-CM

## 2021-02-02 DIAGNOSIS — R93.9 DIAGNOSTIC IMAGING INCONCLUSIVE DUE TO EXCESS BODY FAT OF PATIENT: ICD-10-CM

## 2021-02-02 DIAGNOSIS — C50.919 MALIGNANT NEOPLASM OF FEMALE BREAST, UNSPECIFIED ESTROGEN RECEPTOR STATUS, UNSPECIFIED LATERALITY, UNSPECIFIED SITE OF BREAST: ICD-10-CM

## 2021-02-02 PROCEDURE — 77066 MAMMO DIGITAL DIAGNOSTIC BILAT WITH TOMO: ICD-10-PCS | Mod: 26,,, | Performed by: RADIOLOGY

## 2021-02-02 PROCEDURE — 77062 MAMMO DIGITAL DIAGNOSTIC BILAT WITH TOMO: ICD-10-PCS | Mod: 26,,, | Performed by: RADIOLOGY

## 2021-02-02 PROCEDURE — 77066 DX MAMMO INCL CAD BI: CPT | Mod: 26,,, | Performed by: RADIOLOGY

## 2021-02-02 PROCEDURE — 77062 BREAST TOMOSYNTHESIS BI: CPT | Mod: 26,,, | Performed by: RADIOLOGY

## 2021-02-02 PROCEDURE — 77062 BREAST TOMOSYNTHESIS BI: CPT | Mod: TC

## 2021-02-10 ENCOUNTER — OFFICE VISIT (OUTPATIENT)
Dept: HEMATOLOGY/ONCOLOGY | Facility: CLINIC | Age: 71
End: 2021-02-10
Payer: MEDICARE

## 2021-02-10 VITALS
DIASTOLIC BLOOD PRESSURE: 75 MMHG | HEIGHT: 61 IN | TEMPERATURE: 98 F | BODY MASS INDEX: 21.34 KG/M2 | SYSTOLIC BLOOD PRESSURE: 139 MMHG | HEART RATE: 94 BPM | WEIGHT: 113 LBS | OXYGEN SATURATION: 97 %

## 2021-02-10 DIAGNOSIS — M85.80 OSTEOPENIA, UNSPECIFIED LOCATION: ICD-10-CM

## 2021-02-10 DIAGNOSIS — Z79.811 VISIT FOR MONITORING ARIMIDEX THERAPY: ICD-10-CM

## 2021-02-10 DIAGNOSIS — Z28.9 DELAYED VACCINATION: ICD-10-CM

## 2021-02-10 DIAGNOSIS — Z51.81 ENCOUNTER FOR MONITORING AROMATASE INHIBITOR THERAPY: ICD-10-CM

## 2021-02-10 DIAGNOSIS — Z79.83 ENCOUNTER FOR MONITORING BISPHOSPHONATE THERAPY: ICD-10-CM

## 2021-02-10 DIAGNOSIS — D75.1 POLYCYTHEMIA, SECONDARY: ICD-10-CM

## 2021-02-10 DIAGNOSIS — Z17.0 ESTROGEN RECEPTOR POSITIVE: ICD-10-CM

## 2021-02-10 DIAGNOSIS — Z79.811 ENCOUNTER FOR MONITORING AROMATASE INHIBITOR THERAPY: ICD-10-CM

## 2021-02-10 DIAGNOSIS — F17.200 CURRENT SMOKER: ICD-10-CM

## 2021-02-10 DIAGNOSIS — Z51.81 VISIT FOR MONITORING ARIMIDEX THERAPY: ICD-10-CM

## 2021-02-10 DIAGNOSIS — C50.412 MALIGNANT NEOPLASM OF UPPER-OUTER QUADRANT OF LEFT FEMALE BREAST, UNSPECIFIED ESTROGEN RECEPTOR STATUS: Primary | ICD-10-CM

## 2021-02-10 DIAGNOSIS — Z51.81 ENCOUNTER FOR MONITORING BISPHOSPHONATE THERAPY: ICD-10-CM

## 2021-02-10 PROCEDURE — 94760 N-INVAS EAR/PLS OXIMETRY 1: CPT | Mod: PBBFAC | Performed by: INTERNAL MEDICINE

## 2021-02-10 PROCEDURE — 99215 PR OFFICE/OUTPT VISIT, EST, LEVL V, 40-54 MIN: ICD-10-PCS | Mod: S$PBB,,, | Performed by: INTERNAL MEDICINE

## 2021-02-10 PROCEDURE — 99213 OFFICE O/P EST LOW 20 MIN: CPT | Mod: PBBFAC | Performed by: INTERNAL MEDICINE

## 2021-02-10 PROCEDURE — 99215 OFFICE O/P EST HI 40 MIN: CPT | Mod: S$PBB,,, | Performed by: INTERNAL MEDICINE

## 2021-02-10 PROCEDURE — 99999 PR PBB SHADOW E&M-EST. PATIENT-LVL III: ICD-10-PCS | Mod: PBBFAC,,, | Performed by: INTERNAL MEDICINE

## 2021-02-10 PROCEDURE — 99999 PR PBB SHADOW E&M-EST. PATIENT-LVL III: CPT | Mod: PBBFAC,,, | Performed by: INTERNAL MEDICINE

## 2021-04-12 ENCOUNTER — TELEPHONE (OUTPATIENT)
Dept: HEMATOLOGY/ONCOLOGY | Facility: CLINIC | Age: 71
End: 2021-04-12

## 2021-04-27 ENCOUNTER — PATIENT MESSAGE (OUTPATIENT)
Dept: HEMATOLOGY/ONCOLOGY | Facility: CLINIC | Age: 71
End: 2021-04-27

## 2021-04-27 DIAGNOSIS — C50.919 MALIGNANT NEOPLASM OF FEMALE BREAST, UNSPECIFIED ESTROGEN RECEPTOR STATUS, UNSPECIFIED LATERALITY, UNSPECIFIED SITE OF BREAST: ICD-10-CM

## 2021-04-27 RX ORDER — ANASTROZOLE 1 MG/1
1 TABLET ORAL DAILY
Qty: 90 TABLET | Refills: 1 | Status: SHIPPED | OUTPATIENT
Start: 2021-04-27 | End: 2021-05-14 | Stop reason: SDUPTHER

## 2021-05-06 ENCOUNTER — TELEPHONE (OUTPATIENT)
Dept: HEMATOLOGY/ONCOLOGY | Facility: CLINIC | Age: 71
End: 2021-05-06

## 2021-05-10 ENCOUNTER — TELEPHONE (OUTPATIENT)
Dept: HEMATOLOGY/ONCOLOGY | Facility: CLINIC | Age: 71
End: 2021-05-10

## 2021-05-10 ENCOUNTER — TELEPHONE (OUTPATIENT)
Dept: UROLOGY | Facility: CLINIC | Age: 71
End: 2021-05-10

## 2021-05-13 ENCOUNTER — TELEPHONE (OUTPATIENT)
Dept: HEMATOLOGY/ONCOLOGY | Facility: CLINIC | Age: 71
End: 2021-05-13

## 2021-05-14 ENCOUNTER — LAB VISIT (OUTPATIENT)
Dept: LAB | Facility: HOSPITAL | Age: 71
End: 2021-05-14
Attending: INTERNAL MEDICINE
Payer: MEDICARE

## 2021-05-14 ENCOUNTER — OFFICE VISIT (OUTPATIENT)
Dept: HEMATOLOGY/ONCOLOGY | Facility: CLINIC | Age: 71
End: 2021-05-14
Payer: MEDICARE

## 2021-05-14 VITALS
HEART RATE: 84 BPM | BODY MASS INDEX: 20.47 KG/M2 | DIASTOLIC BLOOD PRESSURE: 69 MMHG | TEMPERATURE: 98 F | SYSTOLIC BLOOD PRESSURE: 139 MMHG | WEIGHT: 108.44 LBS | HEIGHT: 61 IN

## 2021-05-14 DIAGNOSIS — D75.1 ERYTHROCYTOSIS: ICD-10-CM

## 2021-05-14 DIAGNOSIS — F17.200 CURRENT SMOKER: ICD-10-CM

## 2021-05-14 DIAGNOSIS — Z79.811 LONG TERM (CURRENT) USE OF AROMATASE INHIBITORS: ICD-10-CM

## 2021-05-14 DIAGNOSIS — C50.919 MALIGNANT NEOPLASM OF FEMALE BREAST, UNSPECIFIED ESTROGEN RECEPTOR STATUS, UNSPECIFIED LATERALITY, UNSPECIFIED SITE OF BREAST: Primary | ICD-10-CM

## 2021-05-14 LAB
ANION GAP SERPL CALC-SCNC: 13 MMOL/L (ref 8–16)
BUN SERPL-MCNC: 7 MG/DL (ref 8–23)
CALCIUM SERPL-MCNC: 9.4 MG/DL (ref 8.7–10.5)
CHLORIDE SERPL-SCNC: 97 MMOL/L (ref 95–110)
CO2 SERPL-SCNC: 27 MMOL/L (ref 23–29)
CREAT SERPL-MCNC: 0.7 MG/DL (ref 0.5–1.4)
EST. GFR  (AFRICAN AMERICAN): >60 ML/MIN/1.73 M^2
EST. GFR  (NON AFRICAN AMERICAN): >60 ML/MIN/1.73 M^2
GLUCOSE SERPL-MCNC: 105 MG/DL (ref 70–110)
POTASSIUM SERPL-SCNC: 3.7 MMOL/L (ref 3.5–5.1)
SODIUM SERPL-SCNC: 137 MMOL/L (ref 136–145)

## 2021-05-14 PROCEDURE — 80048 BASIC METABOLIC PNL TOTAL CA: CPT | Performed by: INTERNAL MEDICINE

## 2021-05-14 PROCEDURE — 99214 PR OFFICE/OUTPT VISIT, EST, LEVL IV, 30-39 MIN: ICD-10-PCS | Mod: S$PBB,,, | Performed by: INTERNAL MEDICINE

## 2021-05-14 PROCEDURE — 99213 OFFICE O/P EST LOW 20 MIN: CPT | Mod: PBBFAC | Performed by: INTERNAL MEDICINE

## 2021-05-14 PROCEDURE — 99999 PR PBB SHADOW E&M-EST. PATIENT-LVL III: CPT | Mod: PBBFAC,,, | Performed by: INTERNAL MEDICINE

## 2021-05-14 PROCEDURE — 36415 COLL VENOUS BLD VENIPUNCTURE: CPT | Performed by: INTERNAL MEDICINE

## 2021-05-14 PROCEDURE — 99214 OFFICE O/P EST MOD 30 MIN: CPT | Mod: S$PBB,,, | Performed by: INTERNAL MEDICINE

## 2021-05-14 PROCEDURE — 99999 PR PBB SHADOW E&M-EST. PATIENT-LVL III: ICD-10-PCS | Mod: PBBFAC,,, | Performed by: INTERNAL MEDICINE

## 2021-05-14 RX ORDER — FERROUS SULFATE, DRIED 160(50) MG
1 TABLET, EXTENDED RELEASE ORAL 2 TIMES DAILY WITH MEALS
COMMUNITY

## 2021-05-14 RX ORDER — ANASTROZOLE 1 MG/1
1 TABLET ORAL DAILY
Qty: 90 TABLET | Refills: 1 | Status: SHIPPED | OUTPATIENT
Start: 2021-05-14 | End: 2022-01-06

## 2021-05-26 ENCOUNTER — TELEPHONE (OUTPATIENT)
Dept: INFUSION THERAPY | Facility: HOSPITAL | Age: 71
End: 2021-05-26

## 2021-06-01 ENCOUNTER — TELEPHONE (OUTPATIENT)
Dept: SURGERY | Facility: CLINIC | Age: 71
End: 2021-06-01

## 2021-06-01 ENCOUNTER — TELEPHONE (OUTPATIENT)
Dept: HEMATOLOGY/ONCOLOGY | Facility: CLINIC | Age: 71
End: 2021-06-01

## 2021-06-02 ENCOUNTER — INFUSION (OUTPATIENT)
Dept: INFUSION THERAPY | Facility: HOSPITAL | Age: 71
End: 2021-06-02
Payer: MEDICARE

## 2021-06-02 DIAGNOSIS — Z79.83 ENCOUNTER FOR MONITORING BISPHOSPHONATE THERAPY: Primary | ICD-10-CM

## 2021-06-02 DIAGNOSIS — Z51.81 VISIT FOR MONITORING ARIMIDEX THERAPY: ICD-10-CM

## 2021-06-02 DIAGNOSIS — Z51.81 ENCOUNTER FOR MONITORING BISPHOSPHONATE THERAPY: Primary | ICD-10-CM

## 2021-06-02 DIAGNOSIS — Z79.811 LONG TERM (CURRENT) USE OF AROMATASE INHIBITORS: ICD-10-CM

## 2021-06-02 DIAGNOSIS — M85.80 OSTEOPENIA, UNSPECIFIED LOCATION: ICD-10-CM

## 2021-06-02 DIAGNOSIS — Z79.811 VISIT FOR MONITORING ARIMIDEX THERAPY: ICD-10-CM

## 2021-06-02 PROCEDURE — 96372 THER/PROPH/DIAG INJ SC/IM: CPT

## 2021-06-02 PROCEDURE — 63600175 PHARM REV CODE 636 W HCPCS: Mod: JG | Performed by: INTERNAL MEDICINE

## 2021-06-02 RX ADMIN — DENOSUMAB 60 MG: 60 INJECTION SUBCUTANEOUS at 01:06

## 2021-06-08 ENCOUNTER — TELEPHONE (OUTPATIENT)
Dept: HEMATOLOGY/ONCOLOGY | Facility: CLINIC | Age: 71
End: 2021-06-08

## 2021-06-09 ENCOUNTER — TELEPHONE (OUTPATIENT)
Dept: HEMATOLOGY/ONCOLOGY | Facility: CLINIC | Age: 71
End: 2021-06-09

## 2021-07-06 ENCOUNTER — HOSPITAL ENCOUNTER (OUTPATIENT)
Dept: RADIOLOGY | Facility: HOSPITAL | Age: 71
Discharge: HOME | End: 2021-07-06
Attending: INTERNAL MEDICINE
Payer: MEDICARE

## 2021-07-06 DIAGNOSIS — C50.919 MALIGNANT NEOPLASM OF FEMALE BREAST, UNSPECIFIED ESTROGEN RECEPTOR STATUS, UNSPECIFIED LATERALITY, UNSPECIFIED SITE OF BREAST: ICD-10-CM

## 2021-07-06 PROCEDURE — 77080 DEXA BONE DENSITY SPINE HIP: ICD-10-PCS | Mod: 26,,, | Performed by: RADIOLOGY

## 2021-07-06 PROCEDURE — 77080 DXA BONE DENSITY AXIAL: CPT | Mod: TC

## 2021-07-06 PROCEDURE — 77080 DXA BONE DENSITY AXIAL: CPT | Mod: 26,,, | Performed by: RADIOLOGY

## 2021-07-07 ENCOUNTER — LAB VISIT (OUTPATIENT)
Dept: LAB | Facility: HOSPITAL | Age: 71
End: 2021-07-07
Attending: INTERNAL MEDICINE
Payer: MEDICARE

## 2021-07-07 DIAGNOSIS — C50.912 MALIGNANT NEOPLASM OF LEFT FEMALE BREAST, UNSPECIFIED ESTROGEN RECEPTOR STATUS, UNSPECIFIED SITE OF BREAST: ICD-10-CM

## 2021-07-07 LAB
ALBUMIN SERPL BCP-MCNC: 3.6 G/DL (ref 3.5–5.2)
ALP SERPL-CCNC: 84 U/L (ref 55–135)
ALT SERPL W/O P-5'-P-CCNC: 12 U/L (ref 10–44)
ANION GAP SERPL CALC-SCNC: 10 MMOL/L (ref 8–16)
AST SERPL-CCNC: 23 U/L (ref 10–40)
BASOPHILS # BLD AUTO: 0.03 K/UL (ref 0–0.2)
BASOPHILS NFR BLD: 0.3 % (ref 0–1.9)
BILIRUB SERPL-MCNC: 0.8 MG/DL (ref 0.1–1)
BUN SERPL-MCNC: 7 MG/DL (ref 8–23)
CALCIUM SERPL-MCNC: 9.5 MG/DL (ref 8.7–10.5)
CHLORIDE SERPL-SCNC: 102 MMOL/L (ref 95–110)
CO2 SERPL-SCNC: 28 MMOL/L (ref 23–29)
CREAT SERPL-MCNC: 0.8 MG/DL (ref 0.5–1.4)
DIFFERENTIAL METHOD: ABNORMAL
EOSINOPHIL # BLD AUTO: 0.2 K/UL (ref 0–0.5)
EOSINOPHIL NFR BLD: 1.7 % (ref 0–8)
ERYTHROCYTE [DISTWIDTH] IN BLOOD BY AUTOMATED COUNT: 14.1 % (ref 11.5–14.5)
EST. GFR  (AFRICAN AMERICAN): >60 ML/MIN/1.73 M^2
EST. GFR  (NON AFRICAN AMERICAN): >60 ML/MIN/1.73 M^2
GLUCOSE SERPL-MCNC: 139 MG/DL (ref 70–110)
HCT VFR BLD AUTO: 48.5 % (ref 37–48.5)
HGB BLD-MCNC: 16.1 G/DL (ref 12–16)
IMM GRANULOCYTES # BLD AUTO: 0.03 K/UL (ref 0–0.04)
IMM GRANULOCYTES NFR BLD AUTO: 0.3 % (ref 0–0.5)
LDH SERPL L TO P-CCNC: 201 U/L (ref 110–260)
LYMPHOCYTES # BLD AUTO: 1.8 K/UL (ref 1–4.8)
LYMPHOCYTES NFR BLD: 20.7 % (ref 18–48)
MCH RBC QN AUTO: 31.3 PG (ref 27–31)
MCHC RBC AUTO-ENTMCNC: 33.2 G/DL (ref 32–36)
MCV RBC AUTO: 94 FL (ref 82–98)
MONOCYTES # BLD AUTO: 0.8 K/UL (ref 0.3–1)
MONOCYTES NFR BLD: 9.1 % (ref 4–15)
NEUTROPHILS # BLD AUTO: 5.9 K/UL (ref 1.8–7.7)
NEUTROPHILS NFR BLD: 67.9 % (ref 38–73)
NRBC BLD-RTO: 0 /100 WBC
PLATELET # BLD AUTO: 201 K/UL (ref 150–450)
PMV BLD AUTO: 9.9 FL (ref 9.2–12.9)
POTASSIUM SERPL-SCNC: 5 MMOL/L (ref 3.5–5.1)
PROT SERPL-MCNC: 7 G/DL (ref 6–8.4)
RBC # BLD AUTO: 5.14 M/UL (ref 4–5.4)
SODIUM SERPL-SCNC: 140 MMOL/L (ref 136–145)
WBC # BLD AUTO: 8.64 K/UL (ref 3.9–12.7)

## 2021-07-07 PROCEDURE — 83615 LACTATE (LD) (LDH) ENZYME: CPT | Performed by: INTERNAL MEDICINE

## 2021-07-07 PROCEDURE — 36415 COLL VENOUS BLD VENIPUNCTURE: CPT | Performed by: INTERNAL MEDICINE

## 2021-07-07 PROCEDURE — 85025 COMPLETE CBC W/AUTO DIFF WBC: CPT | Performed by: INTERNAL MEDICINE

## 2021-07-07 PROCEDURE — 80053 COMPREHEN METABOLIC PANEL: CPT | Performed by: INTERNAL MEDICINE

## 2021-07-08 ENCOUNTER — OFFICE VISIT (OUTPATIENT)
Dept: HEMATOLOGY/ONCOLOGY | Facility: CLINIC | Age: 71
End: 2021-07-08
Payer: MEDICARE

## 2021-07-08 VITALS
BODY MASS INDEX: 21.27 KG/M2 | WEIGHT: 112.63 LBS | SYSTOLIC BLOOD PRESSURE: 142 MMHG | DIASTOLIC BLOOD PRESSURE: 77 MMHG | HEART RATE: 95 BPM | HEIGHT: 61 IN

## 2021-07-08 DIAGNOSIS — D75.1 POLYCYTHEMIA, SECONDARY: ICD-10-CM

## 2021-07-08 DIAGNOSIS — F17.200 CURRENT SMOKER: ICD-10-CM

## 2021-07-08 DIAGNOSIS — Z79.811 LONG TERM (CURRENT) USE OF AROMATASE INHIBITORS: ICD-10-CM

## 2021-07-08 DIAGNOSIS — C50.912 MALIGNANT NEOPLASM OF LEFT FEMALE BREAST, UNSPECIFIED ESTROGEN RECEPTOR STATUS, UNSPECIFIED SITE OF BREAST: Primary | ICD-10-CM

## 2021-07-08 DIAGNOSIS — M85.80 OSTEOPENIA, UNSPECIFIED LOCATION: ICD-10-CM

## 2021-07-08 PROCEDURE — 99214 PR OFFICE/OUTPT VISIT, EST, LEVL IV, 30-39 MIN: ICD-10-PCS | Mod: S$PBB,,, | Performed by: INTERNAL MEDICINE

## 2021-07-08 PROCEDURE — 99213 OFFICE O/P EST LOW 20 MIN: CPT | Mod: PBBFAC | Performed by: INTERNAL MEDICINE

## 2021-07-08 PROCEDURE — 99214 OFFICE O/P EST MOD 30 MIN: CPT | Mod: S$PBB,,, | Performed by: INTERNAL MEDICINE

## 2021-07-08 PROCEDURE — 99999 PR PBB SHADOW E&M-EST. PATIENT-LVL III: CPT | Mod: PBBFAC,,, | Performed by: INTERNAL MEDICINE

## 2021-07-08 PROCEDURE — 99999 PR PBB SHADOW E&M-EST. PATIENT-LVL III: ICD-10-PCS | Mod: PBBFAC,,, | Performed by: INTERNAL MEDICINE

## 2022-01-18 ENCOUNTER — TELEPHONE (OUTPATIENT)
Dept: HEMATOLOGY/ONCOLOGY | Facility: CLINIC | Age: 72
End: 2022-01-18
Payer: MEDICARE

## 2022-01-18 NOTE — TELEPHONE ENCOUNTER
----- Message from ANGE Bender sent at 1/18/2022  9:18 AM CST -----  Regarding: New Referral  Morning,         We received a referral for this patient with bilateral lung cancers. [facility] is scanning all the records. Can we please bring her in with her CTs and PET on a disc? If she hasn't had PFTs, she'll need those as well.     Thank you!  Zulay

## 2022-01-18 NOTE — NURSING
Received referral for pt to see Dr. Mcnulty.  Called and spoke with pt and is agreeable with scheduling to see Dr. Mcnulty to discuss new lung nodule findings.  Faxed request to [facility] for imaging sharing and copy of image reports.

## 2022-01-26 ENCOUNTER — OFFICE VISIT (OUTPATIENT)
Dept: CARDIOTHORACIC SURGERY | Facility: CLINIC | Age: 72
End: 2022-01-26
Payer: MEDICARE

## 2022-01-26 ENCOUNTER — HOSPITAL ENCOUNTER (OUTPATIENT)
Dept: RADIOLOGY | Facility: HOSPITAL | Age: 72
Discharge: HOME | End: 2022-01-26
Attending: PHYSICIAN ASSISTANT
Payer: MEDICARE

## 2022-01-26 VITALS
BODY MASS INDEX: 21.61 KG/M2 | SYSTOLIC BLOOD PRESSURE: 160 MMHG | HEART RATE: 79 BPM | WEIGHT: 114.44 LBS | DIASTOLIC BLOOD PRESSURE: 78 MMHG | HEIGHT: 61 IN | OXYGEN SATURATION: 99 %

## 2022-01-26 DIAGNOSIS — C34.12 MALIGNANT NEOPLASM OF UPPER LOBE OF LEFT LUNG: ICD-10-CM

## 2022-01-26 DIAGNOSIS — C34.90 NON-SMALL CELL LUNG CANCER, UNSPECIFIED LATERALITY: ICD-10-CM

## 2022-01-26 DIAGNOSIS — C34.31 MALIGNANT NEOPLASM OF LOWER LOBE OF RIGHT LUNG: Primary | ICD-10-CM

## 2022-01-26 DIAGNOSIS — C34.90 NON-SMALL CELL LUNG CANCER, UNSPECIFIED LATERALITY: Primary | ICD-10-CM

## 2022-01-26 PROCEDURE — 71250 CT CHEST WITHOUT CONTRAST: ICD-10-PCS | Mod: 26,,, | Performed by: RADIOLOGY

## 2022-01-26 PROCEDURE — 99213 OFFICE O/P EST LOW 20 MIN: CPT | Mod: PBBFAC,25 | Performed by: THORACIC SURGERY (CARDIOTHORACIC VASCULAR SURGERY)

## 2022-01-26 PROCEDURE — 71250 CT THORAX DX C-: CPT | Mod: 26,,, | Performed by: RADIOLOGY

## 2022-01-26 PROCEDURE — 99205 PR OFFICE/OUTPT VISIT, NEW, LEVL V, 60-74 MIN: ICD-10-PCS | Mod: S$PBB,,, | Performed by: THORACIC SURGERY (CARDIOTHORACIC VASCULAR SURGERY)

## 2022-01-26 PROCEDURE — 71250 CT THORAX DX C-: CPT | Mod: TC

## 2022-01-26 PROCEDURE — 99205 OFFICE O/P NEW HI 60 MIN: CPT | Mod: S$PBB,,, | Performed by: THORACIC SURGERY (CARDIOTHORACIC VASCULAR SURGERY)

## 2022-01-26 PROCEDURE — 99999 PR PBB SHADOW E&M-EST. PATIENT-LVL III: CPT | Mod: PBBFAC,,, | Performed by: THORACIC SURGERY (CARDIOTHORACIC VASCULAR SURGERY)

## 2022-01-26 PROCEDURE — 99999 PR PBB SHADOW E&M-EST. PATIENT-LVL III: ICD-10-PCS | Mod: PBBFAC,,, | Performed by: THORACIC SURGERY (CARDIOTHORACIC VASCULAR SURGERY)

## 2022-01-26 NOTE — PROGRESS NOTES
History & Physical    SUBJECTIVE:     History of Present Illness:  Patient is a 71 y.o. female smoker with left breast cancer (lumpectomy and XRT), HTN, HLD, PAD, and JAGRUTI adenocarcinoma here today for evaluation of right lower lobe adenocarcinoma. Abdominal CT incidentally noted RLL mass prompting chest CT and ultimately PET CT. Images showed RLL 2.4 cm spiculated nodule SUV 7.9, JAGRUTI 1.5 cm opacity SUV 2, and 9mm prevascular LN without avidity. Per patient serial navigational bronchoscopies with biopsies of RLL (NSCLC per patient) and JAGRUTI returning adenocarcinoma. 4L negative. She has seen radiation oncology closer to home with plans to start SBRT to JAGRUTI next week. Not on blood thinners. Active at home. No dyspnea complaints. Not on blood thinners. Scheduled to see cardiologist next week.     Smoker. Working on quitting. 60 pack years. Drinks nightly 6 pack of beer.   PSH: aortic bifemoral bypass, left carotid endarterectomy, cornea transplant           Chief Complaint   Patient presents with    Consult       Review of patient's allergies indicates:   Allergen Reactions    Venom-wasp        Current Outpatient Medications   Medication Sig Dispense Refill    aspirin (ECOTRIN) 81 MG EC tablet Take 1 tablet (81 mg total) by mouth once daily. 90 tablet 3    atorvastatin (LIPITOR) 40 MG tablet Take 1 tablet (40 mg total) by mouth once daily. 90 tablet 3    calcium-vitamin D3 (OS-JUSTO 500 + D3) 500 mg-5 mcg (200 unit) per tablet Take 1 tablet by mouth 2 (two) times daily with meals. 600mg p o q d      EPIPEN 2-BLANCA 0.3 mg/0.3 mL AtIn       EScitalopram oxalate (LEXAPRO) 10 MG tablet Take 1 tablet (10 mg total) by mouth once daily. 90 tablet 3    lisinopriL (PRINIVIL,ZESTRIL) 20 MG tablet Take 1 tablet (20 mg total) by mouth once daily. 90 tablet 3    nicotine (NICODERM CQ) 21 mg/24 hr Place 1 patch onto the skin once daily. 28 patch 0    pantoprazole (PROTONIX) 40 MG tablet Take 1 tablet (40 mg total) by mouth once  "daily. 90 tablet 3    prednisoLONE acetate (PRED FORTE) 1 % DrpS Place 1 drop into both eyes once daily. 5 mL 0     No current facility-administered medications for this visit.       Past Medical History:   Diagnosis Date    Breast cancer 2016    left breast CA    Cancer     breast    Full dentures     High cholesterol     HTN (hypertension)     Wears glasses      Past Surgical History:   Procedure Laterality Date    APPENDECTOMY      BREAST BIOPSY Left 2016    left breast CA    BREAST LUMPECTOMY Left 12/06/2016    COLONOSCOPY  03/19/2014    hpp  Dr. Felton     Family History   Problem Relation Age of Onset    Heart failure Brother     Heart failure Mother     Cancer Cousin     Breast cancer Maternal Cousin     Breast cancer Maternal Cousin      Social History     Tobacco Use    Smoking status: Former Smoker     Packs/day: 0.50     Types: Vaping w/o nicotine    Smokeless tobacco: Current User   Substance Use Topics    Alcohol use: Yes     Alcohol/week: 6.0 standard drinks     Types: 6 Cans of beer per week     Comment: drinks beer dailyy    Drug use: No        Review of Systems:  Review of Systems   Constitutional: Negative for activity change.   Respiratory: Negative for shortness of breath.    Genitourinary: Negative for difficulty urinating.   Musculoskeletal: Negative for arthralgias.   Skin: Negative for color change and rash.   Neurological: Negative for dizziness.   Hematological: Negative for adenopathy.   Psychiatric/Behavioral: Negative for agitation. The patient is not nervous/anxious.        OBJECTIVE:     Vital Signs (Most Recent)  Pulse: 79 (01/26/22 1252)  BP: (!) 160/78 (01/26/22 1252)  SpO2: 99 % (01/26/22 1252)  5' 1" (1.549 m)  51.9 kg (114 lb 6.7 oz)     Physical Exam:  Physical Exam  Constitutional:       Appearance: Normal appearance.   Cardiovascular:      Rate and Rhythm: Normal rate and regular rhythm.      Pulses:           Radial pulses are 2+ on the right side and 1+ " on the left side.      Heart sounds: Normal heart sounds.   Pulmonary:      Effort: Pulmonary effort is normal.      Breath sounds: Normal breath sounds.   Abdominal:      Palpations: Abdomen is soft.   Neurological:      Mental Status: She is alert.         PET 11/2021:      PFTS  FEV1: 1.21L 61%  DLCO: 10.16 70%    Chest CT 1/26/21:   1. Lobulated mass within the right lower lobe, measuring up to 3.4 cm.  2. Irregular stellate peribronchial opacity in the apicoposterior segment of the left upper lobe, measuring up to 1.6 cm.  These are adjacent to 2 pack to 8 metallic density radiodense foreign bodies, correlate as to whether there is been previous brachytherapy or other reason for localization.  3. Additional subcentimeter solid nodule in the posterior segment of the right upper lobe.  4. Moderate centrilobular emphysema.  5. Severe coronary artery and aortic calcific atherosclerosis, with coral-shelf plaque noted at the proximal abdominal aorta.  6. Additional findings as above.  This report was flagged in Epic as abnormal.    ASSESSMENT/PLAN:     Patient is a 71 y.o. female smoker with left breast cancer (lumpectomy and XRT), HTN, HLD, PAD, and JAGRUTI adenocarcinoma here today for evaluation of right lower lobe adenocarcinoma.   Presumed synchronous primaries per [place] oncologist.     PLAN:Plan     Obtain outside path report of right lung biopsy. Discuss in tumor board. If consensus is to treat and synchronous primaries will need further surgical evaluation with stress echo and carotid disease given underlying significant coronary and peripheral arterial disease. Concern underlying arterial disease places patient at increased perioperative and post-operative risk. If she is not a surgical candidate will refer back to [place] radiation oncologist. Patient previously scheduled for SBRT to left lung. Encouraged continued efforts at smoking cessation.

## 2022-01-26 NOTE — LETTER
[facility] - Thoracic Surgery  [address]  [facility] [address]  Phone: [phone]  Fax: [phone] January 26, 2022      Annabel Babin MD  [address] [address]    Patient: Arlene López   MR Number: [id number]   YOB: 1950   Date of Visit: 1/26/2022     Dear Dr. Babin:    Thank you for referring Arlene López to me for evaluation. Ms. López presents for evaluation of presumed synchronous primary lung cancers.  She has a small peripheral left upper lobe non-small cell lung cancer and a larger 2.5 cm right lower lobe lung cancer that abuts the oblique fissure.    Ms. López is scheduled for SBRT to the smaller left upper lobe lung mass.  I was asked to evaluate her for right lower lobectomy.  I will present Ms. López's Derek at multi-disciplinary lung tumor board.  If the consensus is to perform right lower lobectomy, I will obtain cardiac clearance given her history of peripheral vascular and cerebrovascular disease.  If she is cleared, I will perform a robotic assisted right lower lobectomy with mediastinal lymph node dissection.    If you have questions, please do not hesitate to call me. I look forward to following Arlene López along with you.    Sincerely,    Willem Mcnulty MD    BLP/ketan    CC  Main Rodriguez III, MD    [facility]

## 2022-02-02 ENCOUNTER — DOCUMENTATION ONLY (OUTPATIENT)
Dept: CARDIOTHORACIC SURGERY | Facility: CLINIC | Age: 72
End: 2022-02-02
Payer: MEDICARE

## 2022-02-02 NOTE — PATIENT CARE CONFERENCE
[facility]      THORACIC MULTIDISCIPLINARY TUMOR BOARD  PATIENT REVIEW FORM  ________________________________________________________________________    CLINIC #: [id number]  DATE: 02/02/2022    DIAGNOSIS:   NSCLC    PRESENTER:   Dr. Mcnulty     PATIENT SUMMARY:   Patient is a 71 y.o. female current smoker with left breast cancer (lumpectomy and XRT), HTN, HLD, PAD, with JAGRUTI and RLL adenocarcinoma Incidentally found RLL mass prompting chest CT and ultimately PET CT. Images showed RLL 2.4 cm spiculated nodule SUV 7.9, JAGRUTI 1.5 cm opacity SUV 2, and 9mm prevascular LN without avidity. Serial navigational bronchoscopies with biopsies resulted with RLL moderately differentiated adenocarcinoma and JAGRUTI moderately differentaited adenocarcinoma however 4L negative. She has seen radiation oncology closer to home with plans to start SBRT to JAGRUTI next week. Updated chest CT shows interval enlargement of RLL to 3.4 and markers identifying JAGRUTI lesion.       PSH: aortic bifemoral bypass, left carotid endarterectomy    PFTS  FEV1: 1.21L 61%     DLCO: 10.16 70%    BOARD RECOMMENDATIONS:   Appear morphologically different on CT. Negative mediastinum. Treat as synchronous primaries. Currently planned per home oncologist for JAGRUTI radiation and surgical work-up to RLL.     Needs further cardiac/carotid testing given extensive coronary and arterial calcifications. If she is not a candidate will refer to radiation for both times.     CONSULT NEEDED:     [] Surgery    [] Hem/Onc    [] Rad/Onc    [] Dietary                 [] Social Service    [] Psychology       [] Pulmonology    Clinical Stage: right Tumor 2 Node(s) 0 Metastasis 0   left Tumor 1 Node(s) 0 Metastasis 0  Pathologic Stage: Tumor  Node(s)  Metastasis     GROUP STAGE:     [] O    [] 1A    [x] IB    [] IIA    [] IIB     [] IIIA     [] IIIB     [] IIIC    [] IV                               [] Local recurrence     [] Regional recurrence     [] Distant recurrence                    [x] NSCLC     [] SCLC     Tumor type     Unstageable:      [] Yes     [x] No  Metastatic site(s):          [x] Tia'l Treatment Guidelines reviewed and care planned is consistent with guidelines.         (i.e., NCCN, NCI, PD, ACO, AUA, etc.)    PRESENTATION AT CANCER CONFERENCE:         [] Prospective    [] Retrospective     [] Follow-Up          [] Eligible for clinical trial

## 2022-02-04 ENCOUNTER — TELEPHONE (OUTPATIENT)
Dept: HEMATOLOGY/ONCOLOGY | Facility: CLINIC | Age: 72
End: 2022-02-04
Payer: MEDICARE

## 2022-02-04 NOTE — TELEPHONE ENCOUNTER
Called Dr. Mejia's office regarding patient's cardiology appt prior to surgery. Per office patient's appointment is now Tuesday 2/8 at 9:30am. Will alert Dr. Mcnulty's team and obtain records once appt is complete.

## 2022-02-23 ENCOUNTER — TELEPHONE (OUTPATIENT)
Dept: HEMATOLOGY/ONCOLOGY | Facility: CLINIC | Age: 72
End: 2022-02-23
Payer: MEDICARE

## 2022-02-23 NOTE — TELEPHONE ENCOUNTER
Spoke with Natalie with Dr. Mejia office regarding cardiac pre-op testing.  Patient was scheduled on 02/21/22.  Now is scheduled for 03/15/22.  Emphasized the importance of needing testing as soon as possible and explained that Ms. López is a cancer patient.  Spoke with patient's  regarding the pre-op cardiac testing.  He states that Mrs. López drank coffee prior to her appointment and  The test could not be done because she drank coffee.  Will follow-up with Dr. Mejia office regarding a sooner clearance.

## 2022-02-25 ENCOUNTER — TELEPHONE (OUTPATIENT)
Dept: HEMATOLOGY/ONCOLOGY | Facility: CLINIC | Age: 72
End: 2022-02-25
Payer: MEDICARE

## 2022-02-25 DIAGNOSIS — R09.89 OTHER SPECIFIED SYMPTOMS AND SIGNS INVOLVING THE CIRCULATORY AND RESPIRATORY SYSTEMS: ICD-10-CM

## 2022-02-25 DIAGNOSIS — I77.89 OTHER SPECIFIED DISORDERS OF ARTERIES AND ARTERIOLES: ICD-10-CM

## 2022-02-25 DIAGNOSIS — I27.89 OTHER SPECIFIED PULMONARY HEART DISEASES: ICD-10-CM

## 2022-02-25 DIAGNOSIS — Z95.828 HISTORY OF AORTO-FEMORAL BYPASS: ICD-10-CM

## 2022-02-25 DIAGNOSIS — Z98.890 HISTORY OF LEFT-SIDED CAROTID ENDARTERECTOMY: ICD-10-CM

## 2022-02-25 NOTE — TELEPHONE ENCOUNTER
Spoke with Ms. López regarding having her carotid US and her stress ECHO done here at [facility].  Patient agreeable to come to [place] for testing.  Patient is scheduled for carotid US and DSE on March 2nd.  NPO instructions given.  Dr. Mcnulty appointment  Is scheduled for Friday at 1130pm.   Patient is active on the portal.  Appointment Reminder mailed.  Also clinic note from Cardiologist uploaded into Media.

## 2022-03-02 ENCOUNTER — HOSPITAL ENCOUNTER (OUTPATIENT)
Dept: VASCULAR SURGERY | Facility: CLINIC | Age: 72
Discharge: HOME | End: 2022-03-02
Attending: THORACIC SURGERY (CARDIOTHORACIC VASCULAR SURGERY)
Payer: MEDICARE

## 2022-03-02 ENCOUNTER — HOSPITAL ENCOUNTER (OUTPATIENT)
Dept: CARDIOLOGY | Facility: HOSPITAL | Age: 72
Discharge: HOME | End: 2022-03-02
Attending: THORACIC SURGERY (CARDIOTHORACIC VASCULAR SURGERY)
Payer: MEDICARE

## 2022-03-02 VITALS — BODY MASS INDEX: 21.52 KG/M2 | WEIGHT: 114 LBS | HEIGHT: 61 IN

## 2022-03-02 DIAGNOSIS — Z95.828 HISTORY OF AORTO-FEMORAL BYPASS: ICD-10-CM

## 2022-03-02 DIAGNOSIS — I77.89 OTHER SPECIFIED DISORDERS OF ARTERIES AND ARTERIOLES: ICD-10-CM

## 2022-03-02 DIAGNOSIS — I27.89 OTHER SPECIFIED PULMONARY HEART DISEASES: ICD-10-CM

## 2022-03-02 DIAGNOSIS — I77.9 BILATERAL CAROTID ARTERY DISEASE: ICD-10-CM

## 2022-03-02 LAB
ASCENDING AORTA: 2.82 CM
AV INDEX (PROSTH): 0.71
AV MEAN GRADIENT: 3 MMHG
AV PEAK GRADIENT: 5 MMHG
AV VALVE AREA: 2.43 CM2
AV VELOCITY RATIO: 0.68
BSA FOR ECHO PROCEDURE: 1.49 M2
CV ECHO LV RWT: 0.43 CM
CV STRESS BASE HR: 80 BPM
DIASTOLIC BLOOD PRESSURE: 72 MMHG
DOP CALC AO PEAK VEL: 1.1 M/S
DOP CALC AO VTI: 22.95 CM
DOP CALC LVOT AREA: 3.4 CM2
DOP CALC LVOT DIAMETER: 2.08 CM
DOP CALC LVOT PEAK VEL: 0.75 M/S
DOP CALC LVOT STROKE VOLUME: 55.7 CM3
DOP CALCLVOT PEAK VEL VTI: 16.4 CM
E WAVE DECELERATION TIME: 298.66 MSEC
E/A RATIO: 0.74
E/E' RATIO: 15.56 M/S
ECHO LV POSTERIOR WALL: 0.77 CM (ref 0.6–1.1)
EJECTION FRACTION: 65 %
FRACTIONAL SHORTENING: 28 % (ref 28–44)
INTERVENTRICULAR SEPTUM: 0.88 CM (ref 0.6–1.1)
IVRT: 99.9 MSEC
LA MAJOR: 4.44 CM
LA MINOR: 4.73 CM
LA WIDTH: 2.71 CM
LEFT ATRIUM SIZE: 2.86 CM
LEFT ATRIUM VOLUME INDEX: 20.3 ML/M2
LEFT ATRIUM VOLUME: 30.18 CM3
LEFT INTERNAL DIMENSION IN SYSTOLE: 2.55 CM (ref 2.1–4)
LEFT VENTRICLE DIASTOLIC VOLUME INDEX: 35.28 ML/M2
LEFT VENTRICLE DIASTOLIC VOLUME: 52.57 ML
LEFT VENTRICLE MASS INDEX: 54 G/M2
LEFT VENTRICLE SYSTOLIC VOLUME INDEX: 15.8 ML/M2
LEFT VENTRICLE SYSTOLIC VOLUME: 23.52 ML
LEFT VENTRICULAR INTERNAL DIMENSION IN DIASTOLE: 3.55 CM (ref 3.5–6)
LEFT VENTRICULAR MASS: 80.36 G
LV LATERAL E/E' RATIO: 14 M/S
LV SEPTAL E/E' RATIO: 17.5 M/S
MV A" WAVE DURATION": 8.28 MSEC
MV PEAK A VEL: 0.94 M/S
MV PEAK E VEL: 0.7 M/S
MV STENOSIS PRESSURE HALF TIME: 86.61 MS
MV VALVE AREA P 1/2 METHOD: 2.54 CM2
OHS CV CPX 1 MINUTE RECOVERY HEART RATE: 127 BPM
OHS CV CPX 85 PERCENT MAX PREDICTED HEART RATE MALE: 120
OHS CV CPX ESTIMATED METS: 8
OHS CV CPX MAX PREDICTED HEART RATE: 142
OHS CV CPX PATIENT IS FEMALE: 1
OHS CV CPX PATIENT IS MALE: 0
OHS CV CPX PEAK DIASTOLIC BLOOD PRESSURE: 77 MMHG
OHS CV CPX PEAK HEAR RATE: 142 BPM
OHS CV CPX PEAK RATE PRESSURE PRODUCT: NORMAL
OHS CV CPX PEAK SYSTOLIC BLOOD PRESSURE: 176 MMHG
OHS CV CPX PERCENT MAX PREDICTED HEART RATE ACHIEVED: 100
OHS CV CPX RATE PRESSURE PRODUCT PRESENTING: NORMAL
PISA TR MAX VEL: 3.11 M/S
PULM VEIN S/D RATIO: 2.04
PV PEAK D VEL: 0.26 M/S
PV PEAK S VEL: 0.53 M/S
RA MAJOR: 4.38 CM
RA PRESSURE: 3 MMHG
RA WIDTH: 2.59 CM
RIGHT VENTRICULAR END-DIASTOLIC DIMENSION: 2.48 CM
RV TISSUE DOPPLER FREE WALL SYSTOLIC VELOCITY 1 (APICAL 4 CHAMBER VIEW): 10.29 CM/S
SINUS: 2.9 CM
STJ: 2.33 CM
STRESS ECHO POST EXERCISE DUR MIN: 5 MINUTES
STRESS ECHO POST EXERCISE DUR SEC: 1 SECONDS
STRESS ST DEPRESSION: 1 MM
SYSTOLIC BLOOD PRESSURE: 150 MMHG
TDI LATERAL: 0.05 M/S
TDI SEPTAL: 0.04 M/S
TDI: 0.05 M/S
TR MAX PG: 39 MMHG
TRICUSPID ANNULAR PLANE SYSTOLIC EXCURSION: 2.34 CM
TV REST PULMONARY ARTERY PRESSURE: 42 MMHG

## 2022-03-02 PROCEDURE — 93351 STRESS TTE COMPLETE: CPT | Mod: 26,,, | Performed by: INTERNAL MEDICINE

## 2022-03-02 PROCEDURE — 93325 DOPPLER ECHO COLOR FLOW MAPG: CPT

## 2022-03-02 PROCEDURE — 93325 DOPPLER ECHO COLOR FLOW MAPG: CPT | Mod: 26,,, | Performed by: INTERNAL MEDICINE

## 2022-03-02 PROCEDURE — 93880 EXTRACRANIAL BILAT STUDY: CPT | Mod: 26,S$PBB,, | Performed by: SURGERY

## 2022-03-02 PROCEDURE — 93880 PR DUPLEX SCAN EXTRACRANIAL,BILAT: ICD-10-PCS | Mod: 26,S$PBB,, | Performed by: SURGERY

## 2022-03-02 PROCEDURE — 93880 EXTRACRANIAL BILAT STUDY: CPT | Mod: PBBFAC | Performed by: SURGERY

## 2022-03-02 PROCEDURE — 93351 STRESS ECHO (CUPID ONLY): ICD-10-PCS | Mod: 26,,, | Performed by: INTERNAL MEDICINE

## 2022-03-02 PROCEDURE — 93320 DOPPLER ECHO COMPLETE: CPT | Mod: 26,,, | Performed by: INTERNAL MEDICINE

## 2022-03-02 PROCEDURE — 93320 STRESS ECHO (CUPID ONLY): ICD-10-PCS | Mod: 26,,, | Performed by: INTERNAL MEDICINE

## 2022-03-02 PROCEDURE — 93325 STRESS ECHO (CUPID ONLY): ICD-10-PCS | Mod: 26,,, | Performed by: INTERNAL MEDICINE

## 2022-03-02 NOTE — PROGRESS NOTES
Pt here for dse today  Pt stated she is very active and walks a lot. Can walk 5-6 blocks and go up stairs without difficulty..  dse and ex2d explained to pt and importance of target hr. Verbalizes understanding. Will walk on treadmill.

## 2022-03-03 ENCOUNTER — TELEPHONE (OUTPATIENT)
Dept: CARDIOTHORACIC SURGERY | Facility: HOSPITAL | Age: 72
End: 2022-03-03
Payer: MEDICARE

## 2022-03-03 ENCOUNTER — TELEPHONE (OUTPATIENT)
Dept: HEMATOLOGY/ONCOLOGY | Facility: CLINIC | Age: 72
End: 2022-03-03
Payer: MEDICARE

## 2022-03-03 NOTE — TELEPHONE ENCOUNTER
Reviewed stress test results with patient. Both EKG and ECHO portions were positive for ischemia. Dr. Mcnulty does not recommend surgical resection of her right sided lung cancer due to high risk for an on table cardiac event. She voiced understanding. We'll cancel her follow up appointment with us and send records to Dr. Babin's office in [place].

## 2022-03-03 NOTE — TELEPHONE ENCOUNTER
Spoke with Audrey with Dr. Babin's office.  Informed her that patient, Arlene López 's Stress Test ias Positive.  She is not a surgical candidate.  Stress test report and clinic notes faxed to Dr. Babin's office.

## 2022-03-10 ENCOUNTER — HOSPITAL ENCOUNTER (OUTPATIENT)
Dept: RADIOLOGY | Facility: HOSPITAL | Age: 72
Discharge: HOME | End: 2022-03-10
Attending: INTERNAL MEDICINE
Payer: MEDICARE

## 2022-03-10 VITALS — BODY MASS INDEX: 21.52 KG/M2 | WEIGHT: 114 LBS | HEIGHT: 61 IN

## 2022-03-10 DIAGNOSIS — C50.912 MALIGNANT NEOPLASM OF LEFT FEMALE BREAST, UNSPECIFIED ESTROGEN RECEPTOR STATUS, UNSPECIFIED SITE OF BREAST: ICD-10-CM

## 2022-03-10 DIAGNOSIS — Z12.31 ENCOUNTER FOR SCREENING MAMMOGRAM FOR MALIGNANT NEOPLASM OF BREAST: ICD-10-CM

## 2022-03-10 PROCEDURE — 77067 SCR MAMMO BI INCL CAD: CPT | Mod: 26,,, | Performed by: RADIOLOGY

## 2022-03-10 PROCEDURE — 77063 BREAST TOMOSYNTHESIS BI: CPT | Mod: 26,,, | Performed by: RADIOLOGY

## 2022-03-10 PROCEDURE — 77067 MAMMO DIGITAL SCREENING BILAT WITH TOMO: ICD-10-PCS | Mod: 26,,, | Performed by: RADIOLOGY

## 2022-03-10 PROCEDURE — 77063 MAMMO DIGITAL SCREENING BILAT WITH TOMO: ICD-10-PCS | Mod: 26,,, | Performed by: RADIOLOGY

## 2022-03-10 PROCEDURE — 77063 BREAST TOMOSYNTHESIS BI: CPT | Mod: TC

## 2022-03-10 PROCEDURE — 77067 SCR MAMMO BI INCL CAD: CPT | Mod: TC

## 2022-09-01 ENCOUNTER — PATIENT MESSAGE (OUTPATIENT)
Dept: HEMATOLOGY/ONCOLOGY | Facility: CLINIC | Age: 72
End: 2022-09-01
Payer: MEDICARE

## 2022-09-10 ENCOUNTER — HOSPITAL ENCOUNTER (EMERGENCY)
Facility: HOSPITAL | Age: 72
Discharge: HOME | End: 2022-09-10
Attending: EMERGENCY MEDICINE
Payer: MEDICARE

## 2022-09-10 VITALS
TEMPERATURE: 98 F | HEART RATE: 81 BPM | SYSTOLIC BLOOD PRESSURE: 121 MMHG | BODY MASS INDEX: 20.77 KG/M2 | HEIGHT: 61 IN | OXYGEN SATURATION: 97 % | DIASTOLIC BLOOD PRESSURE: 71 MMHG | RESPIRATION RATE: 19 BRPM | WEIGHT: 110 LBS

## 2022-09-10 DIAGNOSIS — R11.2 INTRACTABLE VOMITING WITH NAUSEA, UNSPECIFIED VOMITING TYPE: Primary | ICD-10-CM

## 2022-09-10 LAB
ALBUMIN SERPL BCP-MCNC: 3.8 G/DL (ref 3.5–5.2)
ALP SERPL-CCNC: 97 U/L (ref 55–135)
ALT SERPL W/O P-5'-P-CCNC: 14 U/L (ref 10–44)
ANION GAP SERPL CALC-SCNC: 14 MMOL/L (ref 8–16)
APTT BLDCRRT: 23.7 SEC (ref 21–32)
AST SERPL-CCNC: 24 U/L (ref 10–40)
BASOPHILS # BLD AUTO: 0.04 K/UL (ref 0–0.2)
BASOPHILS NFR BLD: 0.4 % (ref 0–1.9)
BILIRUB SERPL-MCNC: 0.8 MG/DL (ref 0.1–1)
BUN SERPL-MCNC: 6 MG/DL (ref 8–23)
CALCIUM SERPL-MCNC: 9.6 MG/DL (ref 8.7–10.5)
CHLORIDE SERPL-SCNC: 96 MMOL/L (ref 95–110)
CK MB SERPL-MCNC: 1.4 NG/ML (ref 0.1–6.5)
CK MB SERPL-RTO: 3.8 % (ref 0–5)
CK SERPL-CCNC: 37 U/L (ref 20–180)
CK SERPL-CCNC: 37 U/L (ref 20–180)
CO2 SERPL-SCNC: 26 MMOL/L (ref 23–29)
CREAT SERPL-MCNC: 0.7 MG/DL (ref 0.5–1.4)
DIFFERENTIAL METHOD: ABNORMAL
EOSINOPHIL # BLD AUTO: 0.1 K/UL (ref 0–0.5)
EOSINOPHIL NFR BLD: 0.9 % (ref 0–8)
ERYTHROCYTE [DISTWIDTH] IN BLOOD BY AUTOMATED COUNT: 14.2 % (ref 11.5–14.5)
EST. GFR  (NO RACE VARIABLE): >60 ML/MIN/1.73 M^2
GLUCOSE SERPL-MCNC: 106 MG/DL (ref 70–110)
HCT VFR BLD AUTO: 45.1 % (ref 37–48.5)
HGB BLD-MCNC: 15.5 G/DL (ref 12–16)
IMM GRANULOCYTES # BLD AUTO: 0.02 K/UL (ref 0–0.04)
IMM GRANULOCYTES NFR BLD AUTO: 0.2 % (ref 0–0.5)
INR PPP: 1.1 (ref 0.8–1.2)
LYMPHOCYTES # BLD AUTO: 0.9 K/UL (ref 1–4.8)
LYMPHOCYTES NFR BLD: 10 % (ref 18–48)
MCH RBC QN AUTO: 30.8 PG (ref 27–31)
MCHC RBC AUTO-ENTMCNC: 34.4 G/DL (ref 32–36)
MCV RBC AUTO: 90 FL (ref 82–98)
MONOCYTES # BLD AUTO: 1 K/UL (ref 0.3–1)
MONOCYTES NFR BLD: 10.9 % (ref 4–15)
NEUTROPHILS # BLD AUTO: 6.9 K/UL (ref 1.8–7.7)
NEUTROPHILS NFR BLD: 77.6 % (ref 38–73)
NRBC BLD-RTO: 0 /100 WBC
PLATELET # BLD AUTO: 164 K/UL (ref 150–450)
PMV BLD AUTO: 9.4 FL (ref 9.2–12.9)
POTASSIUM SERPL-SCNC: 3.7 MMOL/L (ref 3.5–5.1)
PROT SERPL-MCNC: 7.4 G/DL (ref 6–8.4)
PROTHROMBIN TIME: 11.2 SEC (ref 9–12.5)
RBC # BLD AUTO: 5.04 M/UL (ref 4–5.4)
SODIUM SERPL-SCNC: 136 MMOL/L (ref 136–145)
TROPONIN I SERPL DL<=0.01 NG/ML-MCNC: <0.006 NG/ML (ref 0–0.03)
WBC # BLD AUTO: 8.94 K/UL (ref 3.9–12.7)

## 2022-09-10 PROCEDURE — 80053 COMPREHEN METABOLIC PANEL: CPT | Performed by: EMERGENCY MEDICINE

## 2022-09-10 PROCEDURE — 85610 PROTHROMBIN TIME: CPT | Performed by: EMERGENCY MEDICINE

## 2022-09-10 PROCEDURE — 99283 EMERGENCY DEPT VISIT LOW MDM: CPT | Mod: 25

## 2022-09-10 PROCEDURE — 85730 THROMBOPLASTIN TIME PARTIAL: CPT | Performed by: EMERGENCY MEDICINE

## 2022-09-10 PROCEDURE — 82553 CREATINE MB FRACTION: CPT | Performed by: EMERGENCY MEDICINE

## 2022-09-10 PROCEDURE — 85025 COMPLETE CBC W/AUTO DIFF WBC: CPT | Performed by: EMERGENCY MEDICINE

## 2022-09-10 PROCEDURE — 25000003 PHARM REV CODE 250: Performed by: NURSE PRACTITIONER

## 2022-09-10 PROCEDURE — 84484 ASSAY OF TROPONIN QUANT: CPT | Performed by: EMERGENCY MEDICINE

## 2022-09-10 RX ORDER — ONDANSETRON 4 MG/1
4 TABLET, FILM COATED ORAL EVERY 8 HOURS PRN
Qty: 12 TABLET | Refills: 0 | Status: SHIPPED | OUTPATIENT
Start: 2022-09-10 | End: 2023-05-25

## 2022-09-10 RX ORDER — ONDANSETRON 4 MG/1
4 TABLET, ORALLY DISINTEGRATING ORAL
Status: COMPLETED | OUTPATIENT
Start: 2022-09-10 | End: 2022-09-10

## 2022-09-10 RX ADMIN — ONDANSETRON 4 MG: 4 TABLET, ORALLY DISINTEGRATING ORAL at 03:09

## 2022-09-10 NOTE — ED TRIAGE NOTES
Patient with recent hiatal hernia surgery.(3 weeks ago). Patient reports nausea and vomiting with epigastric pain x 5 days. Pain radiates to the Left chest wall.

## 2022-09-10 NOTE — ED PROVIDER NOTES
Encounter Date: 9/10/2022       History     Chief Complaint   Patient presents with    Vomiting    Abdominal Pain     Patient reports nausea vomiting x 5 days . Upper abdominal pain .      Presents to ED with complaints of nausea/vomiting for 5 days. States did have hiatal hernia surgery about 2 weeks ago. States saw PCP 2 days ago but was not having problems at that time.     Review of patient's allergies indicates:   Allergen Reactions    Venom-wasp      Past Medical History:   Diagnosis Date    Breast cancer 2016    left breast CA    Cancer     breast    Full dentures     High cholesterol     HTN (hypertension)     Wears glasses      Past Surgical History:   Procedure Laterality Date    APPENDECTOMY      BREAST BIOPSY Left 2016    left breast CA    BREAST LUMPECTOMY Left 12/06/2016    COLONOSCOPY  03/19/2014    hpp  Dr. Felton     Family History   Problem Relation Age of Onset    Heart failure Brother     Heart failure Mother     Cancer Cousin     Breast cancer Maternal Cousin     Breast cancer Maternal Cousin      Social History     Tobacco Use    Smoking status: Former     Packs/day: 0.50     Types: Vaping w/o nicotine, Cigarettes    Smokeless tobacco: Current   Substance Use Topics    Alcohol use: Yes     Alcohol/week: 6.0 standard drinks     Types: 6 Cans of beer per week     Comment: drinks beer dailyy    Drug use: No     Review of Systems   Constitutional:  Negative for fever.   HENT:  Negative for sore throat.    Respiratory:  Negative for shortness of breath.    Cardiovascular:  Negative for chest pain.   Gastrointestinal:  Positive for nausea and vomiting.   Genitourinary:  Negative for dysuria.   Musculoskeletal:  Negative for back pain.   Skin:  Negative for rash.   Neurological:  Negative for weakness.   Hematological:  Does not bruise/bleed easily.     Physical Exam     Initial Vitals [09/10/22 1311]   BP Pulse Resp Temp SpO2   107/70 95 18 98.4 °F (36.9 °C) 95 %      MAP       --         Physical  Exam    Nursing note and vitals reviewed.  Constitutional: She appears well-developed and well-nourished.   HENT:   Head: Normocephalic and atraumatic.   Mouth/Throat: Oropharynx is clear and moist.   Eyes: EOM are normal.   Neck: Neck supple.   Cardiovascular:  Normal rate and regular rhythm.           Pulmonary/Chest: Breath sounds normal. She has no wheezes.   Abdominal: Abdomen is soft. There is no abdominal tenderness.   Musculoskeletal:      Cervical back: Neck supple.     Neurological: She is alert and oriented to person, place, and time.   Skin: Skin is warm and dry. Capillary refill takes less than 2 seconds.   Psychiatric: She has a normal mood and affect.       ED Course   Procedures  Labs Reviewed   CBC W/ AUTO DIFFERENTIAL - Abnormal; Notable for the following components:       Result Value    Lymph # 0.9 (*)     Gran % 77.6 (*)     Lymph % 10.0 (*)     All other components within normal limits   COMPREHENSIVE METABOLIC PANEL - Abnormal; Notable for the following components:    BUN 6 (*)     All other components within normal limits   PROTIME-INR   APTT   CK-MB   CK   TROPONIN I          Imaging Results    None          Medications   ondansetron disintegrating tablet 4 mg (4 mg Oral Given 9/10/22 1541)                 ED Course as of 09/10/22 1639   Sat Sep 10, 2022   1627 Discussed with Dr Hernandez [NP]   1627 States feels better, nausea resolved [NP]      ED Course User Index  [NP] JOE Dumont               Clinical Impression:   Final diagnoses:  [R11.2] Intractable vomiting with nausea, unspecified vomiting type (Primary)      ED Disposition Condition    Discharge Good          ED Prescriptions       Medication Sig Dispense Start Date End Date Auth. Provider    ondansetron (ZOFRAN) 4 MG tablet Take 1 tablet (4 mg total) by mouth every 8 (eight) hours as needed for Nausea. 12 tablet 9/10/2022 -- JOE Dumont          Follow-up Information       Follow up With Specialties Details Why  Contact Info    Main Rodriguez III, MD Internal Medicine, Cardiology  As needed [address] [place] [address]  [phone]               Lita Gómez, [facility]  09/10/22 0929

## 2023-05-16 ENCOUNTER — HOSPITAL ENCOUNTER (OUTPATIENT)
Dept: RADIOLOGY | Facility: HOSPITAL | Age: 73
Discharge: HOME | End: 2023-05-16
Attending: INTERNAL MEDICINE
Payer: MEDICARE

## 2023-05-16 PROCEDURE — 77063 MAMMO DIGITAL SCREENING BILAT WITH TOMO: ICD-10-PCS | Mod: 26,,, | Performed by: RADIOLOGY

## 2023-05-16 PROCEDURE — 77063 BREAST TOMOSYNTHESIS BI: CPT | Mod: 26,,, | Performed by: RADIOLOGY

## 2023-05-16 PROCEDURE — 77067 SCR MAMMO BI INCL CAD: CPT | Mod: TC

## 2023-05-16 PROCEDURE — 77067 SCR MAMMO BI INCL CAD: CPT | Mod: 26,,, | Performed by: RADIOLOGY

## 2023-05-16 PROCEDURE — 77067 MAMMO DIGITAL SCREENING BILAT WITH TOMO: ICD-10-PCS | Mod: 26,,, | Performed by: RADIOLOGY

## 2023-05-25 PROBLEM — K21.9 GASTROESOPHAGEAL REFLUX DISEASE: Status: ACTIVE | Noted: 2023-05-25

## 2023-05-26 PROBLEM — I25.10 CAD IN NATIVE ARTERY: Status: ACTIVE | Noted: 2023-05-26

## 2023-05-26 PROBLEM — Z98.890 HISTORY OF CEA (CAROTID ENDARTERECTOMY): Status: RESOLVED | Noted: 2019-10-17 | Resolved: 2023-05-26

## 2023-05-26 PROBLEM — Z91.038 ALLERGY TO INSECT VENOM: Status: ACTIVE | Noted: 2023-05-26

## 2023-05-26 PROBLEM — R45.89 DYSPHORIC MOOD: Status: ACTIVE | Noted: 2023-05-26

## 2023-05-26 PROBLEM — J44.9 COPD (CHRONIC OBSTRUCTIVE PULMONARY DISEASE): Status: ACTIVE | Noted: 2023-05-26

## 2023-05-26 PROBLEM — Z79.811 LONG TERM (CURRENT) USE OF AROMATASE INHIBITORS: Status: RESOLVED | Noted: 2021-05-14 | Resolved: 2023-05-26

## 2023-05-26 PROBLEM — F41.9 ANXIETY: Status: ACTIVE | Noted: 2023-05-26

## 2023-05-30 PROBLEM — C34.90 MALIGNANT NEOPLASM OF LUNG: Status: ACTIVE | Noted: 2023-05-30

## 2023-05-30 PROBLEM — R45.86 EMOTIONAL LABILITY: Status: RESOLVED | Noted: 2017-03-31 | Resolved: 2023-05-30

## 2023-05-30 PROBLEM — D75.1 POLYCYTHEMIA, SECONDARY: Status: RESOLVED | Noted: 2017-10-17 | Resolved: 2023-05-30

## 2023-05-30 PROBLEM — M85.80 OSTEOPENIA WITH HIGH RISK OF FRACTURE: Status: ACTIVE | Noted: 2019-02-13

## 2023-05-30 PROBLEM — Z85.3 HISTORY OF BREAST CANCER: Status: RESOLVED | Noted: 2019-08-07 | Resolved: 2023-05-30

## 2023-05-31 ENCOUNTER — HOSPITAL ENCOUNTER (OUTPATIENT)
Dept: RADIOLOGY | Facility: HOSPITAL | Age: 73
Discharge: HOME | End: 2023-05-31
Attending: NURSE PRACTITIONER
Payer: MEDICARE

## 2023-05-31 DIAGNOSIS — R22.41 LOCALIZED SWELLING OF RIGHT LOWER EXTREMITY: ICD-10-CM

## 2023-05-31 PROCEDURE — 93970 EXTREMITY STUDY: CPT | Mod: TC

## 2023-05-31 PROCEDURE — 93970 US LOWER EXTREMITY VEINS BILATERAL: ICD-10-PCS | Mod: 26,,, | Performed by: RADIOLOGY

## 2023-05-31 PROCEDURE — 93970 EXTREMITY STUDY: CPT | Mod: 26,,, | Performed by: RADIOLOGY

## 2023-08-17 ENCOUNTER — HOSPITAL ENCOUNTER (EMERGENCY)
Facility: HOSPITAL | Age: 73
Discharge: HOME | End: 2023-08-18
Attending: EMERGENCY MEDICINE
Payer: MEDICARE

## 2023-08-17 VITALS
OXYGEN SATURATION: 95 % | SYSTOLIC BLOOD PRESSURE: 105 MMHG | HEART RATE: 98 BPM | TEMPERATURE: 99 F | HEIGHT: 61 IN | BODY MASS INDEX: 21.71 KG/M2 | RESPIRATION RATE: 16 BRPM | DIASTOLIC BLOOD PRESSURE: 75 MMHG | WEIGHT: 115 LBS

## 2023-08-17 DIAGNOSIS — M86.9 OSTEOMYELITIS OF RIGHT FOOT, UNSPECIFIED TYPE: ICD-10-CM

## 2023-08-17 DIAGNOSIS — R52 PAIN: ICD-10-CM

## 2023-08-17 DIAGNOSIS — M79.674 TOE PAIN, RIGHT: Primary | ICD-10-CM

## 2023-08-17 LAB
ALBUMIN SERPL BCP-MCNC: 3.7 G/DL (ref 3.5–5.2)
ALP SERPL-CCNC: 88 U/L (ref 55–135)
ALT SERPL W/O P-5'-P-CCNC: 16 U/L (ref 10–44)
ANION GAP SERPL CALC-SCNC: 11 MMOL/L (ref 8–16)
AST SERPL-CCNC: 27 U/L (ref 10–40)
BASOPHILS # BLD AUTO: 0.07 K/UL (ref 0–0.2)
BASOPHILS NFR BLD: 1 % (ref 0–1.9)
BILIRUB SERPL-MCNC: 0.5 MG/DL (ref 0.1–1)
BUN SERPL-MCNC: 6 MG/DL (ref 8–23)
CALCIUM SERPL-MCNC: 9.1 MG/DL (ref 8.7–10.5)
CHLORIDE SERPL-SCNC: 102 MMOL/L (ref 95–110)
CO2 SERPL-SCNC: 24 MMOL/L (ref 23–29)
CREAT SERPL-MCNC: 0.7 MG/DL (ref 0.5–1.4)
DIFFERENTIAL METHOD: ABNORMAL
EOSINOPHIL # BLD AUTO: 0.2 K/UL (ref 0–0.5)
EOSINOPHIL NFR BLD: 2.5 % (ref 0–8)
ERYTHROCYTE [DISTWIDTH] IN BLOOD BY AUTOMATED COUNT: 17.3 % (ref 11.5–14.5)
EST. GFR  (NO RACE VARIABLE): >60 ML/MIN/1.73 M^2
GLUCOSE SERPL-MCNC: 86 MG/DL (ref 70–110)
HCT VFR BLD AUTO: 39 % (ref 37–48.5)
HGB BLD-MCNC: 13.1 G/DL (ref 12–16)
IMM GRANULOCYTES # BLD AUTO: 0.02 K/UL (ref 0–0.04)
IMM GRANULOCYTES NFR BLD AUTO: 0.3 % (ref 0–0.5)
LYMPHOCYTES # BLD AUTO: 1.8 K/UL (ref 1–4.8)
LYMPHOCYTES NFR BLD: 26 % (ref 18–48)
MCH RBC QN AUTO: 28.2 PG (ref 27–31)
MCHC RBC AUTO-ENTMCNC: 33.6 G/DL (ref 32–36)
MCV RBC AUTO: 84 FL (ref 82–98)
MONOCYTES # BLD AUTO: 0.7 K/UL (ref 0.3–1)
MONOCYTES NFR BLD: 9.6 % (ref 4–15)
NEUTROPHILS # BLD AUTO: 4.1 K/UL (ref 1.8–7.7)
NEUTROPHILS NFR BLD: 60.6 % (ref 38–73)
NRBC BLD-RTO: 0 /100 WBC
PLATELET # BLD AUTO: 203 K/UL (ref 150–450)
PMV BLD AUTO: 9.2 FL (ref 9.2–12.9)
POTASSIUM SERPL-SCNC: 4.2 MMOL/L (ref 3.5–5.1)
PROT SERPL-MCNC: 7 G/DL (ref 6–8.4)
RBC # BLD AUTO: 4.65 M/UL (ref 4–5.4)
SODIUM SERPL-SCNC: 137 MMOL/L (ref 136–145)
URATE SERPL-MCNC: 5.4 MG/DL (ref 2.4–5.7)
WBC # BLD AUTO: 6.74 K/UL (ref 3.9–12.7)

## 2023-08-17 PROCEDURE — 99284 EMERGENCY DEPT VISIT MOD MDM: CPT | Mod: 25

## 2023-08-17 PROCEDURE — 73630 X-RAY EXAM OF FOOT: CPT | Mod: 26,RT,, | Performed by: RADIOLOGY

## 2023-08-17 PROCEDURE — 85025 COMPLETE CBC W/AUTO DIFF WBC: CPT | Performed by: NURSE PRACTITIONER

## 2023-08-17 PROCEDURE — 80053 COMPREHEN METABOLIC PANEL: CPT | Performed by: NURSE PRACTITIONER

## 2023-08-17 PROCEDURE — 84550 ASSAY OF BLOOD/URIC ACID: CPT | Performed by: NURSE PRACTITIONER

## 2023-08-17 PROCEDURE — 25000003 PHARM REV CODE 250: Performed by: EMERGENCY MEDICINE

## 2023-08-17 PROCEDURE — 63600175 PHARM REV CODE 636 W HCPCS: Performed by: EMERGENCY MEDICINE

## 2023-08-17 PROCEDURE — 73630 X-RAY EXAM OF FOOT: CPT | Mod: TC,RT

## 2023-08-17 PROCEDURE — 73630 XR FOOT COMPLETE 3 VIEW RIGHT: ICD-10-PCS | Mod: 26,RT,, | Performed by: RADIOLOGY

## 2023-08-17 PROCEDURE — 96365 THER/PROPH/DIAG IV INF INIT: CPT

## 2023-08-17 RX ORDER — OXYCODONE AND ACETAMINOPHEN 5; 325 MG/1; MG/1
1 TABLET ORAL
Status: COMPLETED | OUTPATIENT
Start: 2023-08-17 | End: 2023-08-17

## 2023-08-17 RX ORDER — DOXYCYCLINE 100 MG/1
100 CAPSULE ORAL 2 TIMES DAILY
Qty: 20 CAPSULE | Refills: 0 | Status: SHIPPED | OUTPATIENT
Start: 2023-08-17 | End: 2023-08-27

## 2023-08-17 RX ADMIN — OXYCODONE HYDROCHLORIDE AND ACETAMINOPHEN 1 TABLET: 5; 325 TABLET ORAL at 10:08

## 2023-08-17 RX ADMIN — CEFTRIAXONE SODIUM 2 G: 2 INJECTION, POWDER, FOR SOLUTION INTRAMUSCULAR; INTRAVENOUS at 10:08

## 2023-08-17 NOTE — LETTER
Patient: Arlene López  YOB: 1950  Date: 8/17/2023 Time: 10:09 PM  Location: [facility]    Leaving the [facility] Against Medical Advice    Chart #:[id number]    This will certify that I, the undersigned,    ______________________________________________________________________    A patient in the above named [facility] center, having requested discharge and removal from the medical [place] against the advice of my attending physician(s), hereby release [facility], its physicians, officers and employees, severally and individually, from any and all liability of any nature whatsoever for any injury or harm or complication of any kind that may result directly or indirectly, by reason of my terminating my stay as a patient at [facility] and my departure from [facility], and hereby waive any and all rights of action I may now have or later acquire as a result of my voluntary departure from [facility] and the termination of my stay as a patient therein.    This release is made with the full knowledge of the danger that may result from the action which I am taking.      Date:_______________________                         ___________________________                                                                                    Patient/Legal Representative    Witness:        ____________________________                          ___________________________  Nurse                                                                        Physician

## 2023-08-18 NOTE — DISCHARGE INSTRUCTIONS
Please go immediately to [facility] in the morning.  Inform them that you have a bone infection in your toe that requires immediate evaluation.  You may need an MRI.  You should be evaluated by a bone specialist.  You have been given antibiotics here in the emergency department and a prescription for more.

## 2023-08-18 NOTE — FIRST PROVIDER EVALUATION
Emergency Department TeleTriage Encounter Note      CHIEF COMPLAINT    Chief Complaint   Patient presents with    Toe Pain     Pt. C/o swelling, pain, and drainage to the right great toe x 2 nights.       VITAL SIGNS   Initial Vitals [08/17/23 1944]   BP Pulse Resp Temp SpO2   105/75 98 17 99.2 °F (37.3 °C) 95 %      MAP       --            ALLERGIES    Review of patient's allergies indicates:   Allergen Reactions    Venom-wasp        PROVIDER TRIAGE NOTE  Verbal consent for the teletriage evaluation was given by the patient at the start of the evaluation.  All efforts will be made to maintain patient's privacy during the evaluation.      This is a teletriage evaluation of a 73 y.o. female presenting to the ED with c/o right great toe swelling that started last night.  Has had this problem in the past and was diagnosed with gout vs cellulitis. Limited physical exam via telehealth: The patient is awake, alert, answering questions appropriately and is not in respiratory distress.  As the Teletriage provider, I performed an initial assessment and ordered appropriate labs and imaging studies, if any, to facilitate the patient's care once placed in the ED. Once a room is available, care and a full evaluation will be completed by an alternate ED provider.  Any additional orders and the final disposition will be determined by that provider.  All imaging and labs will not be followed-up by the Teletriage Team, including myself.          ORDERS  Labs Reviewed   CBC W/ AUTO DIFFERENTIAL   COMPREHENSIVE METABOLIC PANEL   URIC ACID       ED Orders (720h ago, onward)      Start Ordered     Status Ordering Provider    08/17/23 2030 08/17/23 2029  CBC auto differential  STAT         Ordered REFUGIO SCHROEDER    08/17/23 2030 08/17/23 2029  Comprehensive metabolic panel  STAT         Ordered REFUGIO SCHROEDER    08/17/23 2030 08/17/23 2029  Uric acid  Once         Ordered REFUGIO SCHROEDER    08/17/23 2029 08/17/23 2029  X-Ray Foot Complete  Right  1 time imaging         Ordered REFUGIO SCHROEDER A              Virtual Visit Note: The provider triage portion of this emergency department evaluation and documentation was performed via [employer], a HIPAA-compliant telemedicine application, in concert with a tele-presenter in the room. A face to face patient evaluation with one of my colleagues will occur once the patient is placed in an emergency department room.      DISCLAIMER: This note was prepared with [employer] voice recognition transcription software. Garbled syntax, mangled pronouns, and other bizarre constructions may be attributed to that software system.

## 2023-08-19 NOTE — ED PROVIDER NOTES
Encounter Date: 8/17/2023       History     Chief Complaint   Patient presents with    Toe Pain     Pt. C/o swelling, pain, and drainage to the right great toe x 2 nights.     Very pleasant 73-year-old who presents with toe pain and swelling.  Patient denies fevers no nausea or vomiting.  Patient saw primary care provider recently who started her on antibiotics, she is noticed that the foot has worsened since then    The history is provided by the patient. No  was used.     Review of patient's allergies indicates:   Allergen Reactions    Venom-wasp      Past Medical History:   Diagnosis Date    Abnormal mammogram of left breast 10/04/2016    Allergy to insect venom 05/26/2023    Anxiety 05/26/2023    Bilateral carotid artery disease     Breast cancer 2016    left breast CA    CAD in native artery 05/26/2023    Cancer     breast    Cataracts, bilateral     COPD (chronic obstructive pulmonary disease)     Dysphoric mood 05/26/2023    Full dentures     Gastroesophageal reflux disease 05/25/2023    High cholesterol     HTN (hypertension)     Hyperplastic colon polyp     Incisional hernia     Malignant neoplasm of left female breast 11/03/2016    Malignant neoplasm of lung 05/30/2023    Osteopenia 10/17/2017    PAD (peripheral artery disease)     Wears glasses      Past Surgical History:   Procedure Laterality Date    APPENDECTOMY      BREAST BIOPSY Left 2016    left breast CA    BREAST LUMPECTOMY Left 12/06/2016    CARDIAC CATHETERIZATION  12/10/2019    Mid RCA with 50% stenosis. LCx is chronically occluded but circumflex fills vial collaterals    CAROTID ENDARTERECTOMY Left 10/10/2019    CATARACT EXTRACTION Bilateral     heike implants    COLONOSCOPY  03/19/2014    hpp  Dr. Felton    CORNEAL TRANSPLANT Bilateral     FEMORAL BYPASS  12/13/2019    aorto-bifemoral bypass; bilat endarterectomy of common femoral, profundofemoral, and prox superficial femoral arteries w/ patch    HERNIA REPAIR  08/2022      Family History   Problem Relation Age of Onset    Heart failure Mother     Heart disease Brother     Heart failure Brother     Heart disease Brother     Cancer Cousin     Breast cancer Maternal Cousin     Breast cancer Maternal Cousin      Social History     Tobacco Use    Smoking status: Every Day     Current packs/day: 1.00     Average packs/day: 1 pack/day for 54.6 years (54.6 ttl pk-yrs)     Types: Cigarettes     Start date: 1969     Passive exposure: Past    Smokeless tobacco: Never    Tobacco comments:     Reports started smoking approx age 19. Interested in quitting; reports plans to start using patches.    Substance Use Topics    Alcohol use: Yes     Alcohol/week: 6.0 standard drinks of alcohol     Types: 6 Cans of beer per week     Comment: drinks beer daily    Drug use: No     Review of Systems   Constitutional:  Negative for fever.   HENT:  Negative for sore throat.    Respiratory:  Negative for shortness of breath.    Cardiovascular:  Negative for chest pain.   Gastrointestinal:  Negative for nausea.   Genitourinary:  Negative for dysuria.   Musculoskeletal:  Negative for back pain.   Skin:  Positive for wound. Negative for rash.   Neurological:  Negative for weakness.   Hematological:  Does not bruise/bleed easily.   All other systems reviewed and are negative.      Physical Exam     Initial Vitals [08/17/23 1944]   BP Pulse Resp Temp SpO2   105/75 98 17 99.2 °F (37.3 °C) 95 %      MAP       --         Physical Exam    Nursing note and vitals reviewed.  Constitutional: She appears well-developed and well-nourished.   HENT:   Head: Normocephalic and atraumatic.   Eyes: EOM are normal. Pupils are equal, round, and reactive to light.   Neck:   Normal range of motion.  Cardiovascular:  Normal rate and regular rhythm.           Pulmonary/Chest: Breath sounds normal.   Abdominal: Abdomen is soft.   Musculoskeletal:         General: Normal range of motion.      Cervical back: Normal range of motion.      Neurological: She is alert and oriented to person, place, and time. She has normal strength. GCS score is 15. GCS eye subscore is 4. GCS verbal subscore is 5. GCS motor subscore is 6.   Skin: Skin is warm. Capillary refill takes less than 2 seconds.   Wound to the right toe erythematous material, the erythema extends to the midportion of the great toe   Psychiatric: She has a normal mood and affect. Her behavior is normal. Judgment and thought content normal.         ED Course   Procedures  Labs Reviewed   CBC W/ AUTO DIFFERENTIAL - Abnormal; Notable for the following components:       Result Value    RDW 17.3 (*)     All other components within normal limits   COMPREHENSIVE METABOLIC PANEL - Abnormal; Notable for the following components:    BUN 6 (*)     All other components within normal limits   URIC ACID          Imaging Results              X-Ray Foot Complete Right (Final result)  Result time 08/17/23 21:09:45      Final result by Nuris Shaffer MD (08/17/23 21:09:45)                   Impression:      As above.      Electronically signed by: Nuris Shaffer  Date:    08/17/2023  Time:    21:09               Narrative:    EXAMINATION:  XR FOOT COMPLETE 3 VIEW RIGHT    CLINICAL HISTORY:  . Pain, unspecified    TECHNIQUE:  AP, lateral, and oblique views of the right foot were performed.    COMPARISON:  None    FINDINGS:  Erosive irregularity throughout the great toe distal phalanx with linear lucencies.  Findings are compatible with inflammatory arthritis, osteomyelitis, underlying lesion or sequela of prior trauma..  Correlate for point tenderness to exclude acute fracture.  Recommend MRI forefoot for further characterization.  Mild plantar calcaneal and Achilles enthesopathy.                                       Medications   cefTRIAXone (ROCEPHIN) 2 g in dextrose 5 % in water (D5W) 100 mL IVPB (MB+) (0 g Intravenous Stopped 8/17/23 5587)   oxyCODONE-acetaminophen 5-325 mg per tablet 1 tablet  (1 tablet Oral Given 8/17/23 0256)     Medical Decision Making  Right great toe infection, initially appeared to be a paronychia or toe cellulitis however her x-ray that was ordered in triage shows possible osteomyelitis.  Patient was informed of this and that she would need an MRI to be done in potentially surgery should it be osteomyelitis.  Patient does not want to have that workup done here, she would prefer to go home and sleep in her own bed tonight and then follow up at [facility] which is her hospital of choice in the morning to have it re-evaluated and potentially have MRI and surgery if needed done.  She was given antibiotics here and a prescription for doxycycline.    Problems Addressed:  Osteomyelitis of right foot, unspecified type: acute illness or injury  Toe pain, right: acute illness or injury    Amount and/or Complexity of Data Reviewed  Labs:  Decision-making details documented in ED Course.  Radiology:  Decision-making details documented in ED Course.    Risk  Prescription drug management.                               Clinical Impression:   Final diagnoses:  [R52] Pain  [R52] Pain - right great toe  [M79.674] Toe pain, right (Primary)  [M86.9] Osteomyelitis of right foot, unspecified type        ED Disposition Condition    La Stephen MD  08/18/23 5169